# Patient Record
Sex: FEMALE | HISPANIC OR LATINO | Employment: FULL TIME | URBAN - METROPOLITAN AREA
[De-identification: names, ages, dates, MRNs, and addresses within clinical notes are randomized per-mention and may not be internally consistent; named-entity substitution may affect disease eponyms.]

---
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
## 2018-06-11 ENCOUNTER — OFFICE VISIT (OUTPATIENT)
Dept: OBGYN | Facility: CLINIC | Age: 18
End: 2018-06-11
Payer: COMMERCIAL

## 2018-06-11 VITALS
BODY MASS INDEX: 29.74 KG/M2 | OXYGEN SATURATION: 99 % | SYSTOLIC BLOOD PRESSURE: 111 MMHG | DIASTOLIC BLOOD PRESSURE: 67 MMHG | HEART RATE: 74 BPM | WEIGHT: 162.6 LBS

## 2018-06-11 DIAGNOSIS — Z30.46 NEXPLANON REMOVAL: Primary | ICD-10-CM

## 2018-06-11 DIAGNOSIS — Z30.017 NEXPLANON INSERTION: ICD-10-CM

## 2018-06-11 PROCEDURE — 99213 OFFICE O/P EST LOW 20 MIN: CPT | Mod: 25 | Performed by: OBSTETRICS & GYNECOLOGY

## 2018-06-11 PROCEDURE — 11983 REMOVE/INSERT DRUG IMPLANT: CPT | Performed by: OBSTETRICS & GYNECOLOGY

## 2018-06-11 NOTE — MR AVS SNAPSHOT
After Visit Summary   6/11/2018    Gilma Camp    MRN: [id number]           Patient Information     Date Of Birth          2000        Visit Information        Provider Department      6/11/2018 2:00 PM Za Reagan DO; PROC RM 1 [facility]        Care Instructions                                                         If you have any questions regarding your visit, Please contact your care team.    [facility] CLINIC HOURS TELEPHONE NUMBER   Za Reagan DO.    ANTHONY Carson -    BALA Carney       [facility], [facility]  8:30a.m-5:00 p.m   [facility]  [address]  [place], [place] [address] [phone] ask for [facility]    Imaging [phone]       Urgent Care locations:    [facility] Saturday and Sunday   9 am - 5 pm    Monday-Friday   12 pm - 8 pm  Saturday and Sunday   9 am - 5 pm   [phone]     [facility] Labor and Delivery:  [phone]    If you need a medication refill, please contact your pharmacy. Please allow 3 business days for your refill to be completed.  As always, Thank you for trusting us with your healthcare needs!                Follow-ups after your visit        Your next 10 appointments already scheduled     Jun 25, 2018 11:30 AM CDT   Office Visit with Za Reagan DO   [facility] ([facility]    [address]  [facility] [phone]           Bring a current list of meds and any records pertaining to this visit. For Physicals, please bring immunization records and any forms needing to be filled out. Please arrive 10 minutes early to complete paperwork.              Who to contact     If you have questions or need follow up information about today's clinic visit or your schedule please contact [facility]  [place] directly at [phone].  Normal or non-critical lab and imaging results will be communicated to you by MyChart, letter or phone within 4 business days after the clinic has received the results. If you do not hear from us within 7 days, please contact the clinic through [employer] or phone. If you have a critical or abnormal lab result, we will notify you by phone as soon as possible.  Submit refill requests through [employer] or call your pharmacy and they will forward the refill request to us. Please allow 3 business days for your refill to be completed.          Additional Information About Your Visit        [employer] Information     Sifteo lets you send messages to your doctor, view your test results, renew your prescriptions, schedule appointments and more. To sign up, go to [email or web address] . Click on \"Log in\" on the left side of the screen, which will take you to the Welcome page. Then click on \"Sign up Now\" on the right side of the page.     You will be asked to enter the access code listed below, as well as some personal information. Please follow the directions to create your username and password.     Your access code is: [email or web address]  Expires: 2018  2:22 PM     Your access code will  in 90 days. If you need help or a new code, please call your [place] clinic or [phone].        Care EveryWhere ID     This is your Care EveryWhere ID. This could be used by other organizations to access your [place] medical records  [id number]        Your Vitals Were     Pulse Last Period Pulse Oximetry Breastfeeding? BMI (Body Mass Index)       74 2018 (Approximate) 99% No 29.74 kg/m2        Blood Pressure from Last 3 Encounters:   18 111/67   17 132/86   17 124/74    Weight from Last 3 Encounters:   18 162 lb 9.6 oz (73.8 kg)   17 163 lb (73.9 kg) (89 %)*   17 174 lb (78.9 kg) (93 %)*     * Growth percentiles are based on CDC 2-20 Years data.            "   Today, you had the following     No orders found for display         Today's Medication Changes          These changes are accurate as of 6/11/18  2:22 PM.  If you have any questions, ask your nurse or doctor.               Stop taking these medicines if you haven't already. Please contact your care team if you have questions.     drospirenone-ethinyl estradiol 3-0.03 MG per tablet   Commonly known as:  COURTNEY   Stopped by:  Za Reagan,                     Primary Care Provider Office Phone # Fax #    Jacquelyn Alejandre, APRN [facility] [phone]       [address]   [place] [address]        Equal Access to Services     [facility] HospitalSHAYY : Hadii octavio martinez hadjyothio Sojuliocesar, waaxda luqadaha, qaybta kaalmada adeegyada, marvin catherine . So [facility] [place] [phone].    ATENCIÓN: Si habla español, tiene a conley disposición servicios gratuitos de asistencia lingüística. Llame al [phone].    We comply with applicable federal civil rights laws and [place] laws. We do not discriminate on the basis of race, color, national origin, age, disability, sex, sexual orientation, or gender identity.            Thank you!     Thank you for choosing [facility]  for your care. Our goal is always to provide you with excellent care. Hearing back from our patients is one way we can continue to improve our services. Please take a few minutes to complete the written survey that you may receive in the mail after your visit with us. Thank you!             Your Updated Medication List - Protect others around you: Learn how to safely use, store and throw away your medicines at www.disposemymeds.org.          This list is accurate as of 6/11/18  2:22 PM.  Always use your most recent med list.                   Brand Name Dispense Instructions for use Diagnosis    etonogestrel 68 MG Impl    IMPLANON/NEXPLANON    1 each    1 each (68 mg) by Subdermal route once for 1 dose     Nexplanon insertion       polyethylene glycol powder    MIRALAX    510 g    Take 17 g (1 capful) by mouth daily    Constipation, unspecified constipation type

## 2018-06-11 NOTE — PATIENT INSTRUCTIONS
If you have any questions regarding your visit, Please contact your care team.    [facility] CLINIC HOURS TELEPHONE NUMBER   Za Reagan DO.    ANTHONY Carson -    BALA Carney       Monday, Wednesday, Thursday and Friday, [place]  8:30a.m-5:00 p.m   [facility]  [address] [place] [address]  [phone] ask for [facility]    Imaging [phone]       Urgent Care locations:    [facility] Saturday and Sunday   9 am - 5 pm    Monday-Friday   12 pm - 8 pm  Saturday and Sunday   9 am - 5 pm   [phone]     [facility] Labor and Delivery:  [phone]    If you need a medication refill, please contact your pharmacy. Please allow 3 business days for your refill to be completed.  As always, Thank you for trusting us with your healthcare needs!

## 2018-06-11 NOTE — PROGRESS NOTES
This 17 y/o female, , LMP 18, presents for removal of her current Nexplanon which expires this month since it was place on 6/3/15.  She requests insertion of a new Nexplanon since she prefers this form of contraception.  She is s/p primary classical C/S on 14 at 26 weeks gestation and is not interested in future pregnancy at this time.  She denies any risk of pregnancy or STDs.  /67  Pulse 74  Wt 162 lb 9.6 oz (73.8 kg)  LMP 2018 (Approximate)  SpO2 99%  Breastfeeding? No  BMI 29.74 kg/m2  ROS:  10 systems were reviewed and the positives were placed under problems.  Informed consent was reviewed and obtained for removal of her  Nexplanon and insertion of a new Nexplanon since she prefers its use for contraception.  Using sterile technique, the exit garcía was made with a purple pen and the overlying skin was cleansed with betadine x 3 swabs followed by an alcohol prep pad.  The site was then injected with 1% Lidocaine with care to avoid involving the purple garcía with 4 ccs used with spillage.  After testing the site and making sure that she was numb, a stab wound was made at the exit garcía and her current Nexplanon katty was removed without difficulty per protocol using a Mosquito clamp.  Next, the new Nexplanon was inserted without difficulty and Tincture of Benzoin was applied to the peripheral skin of her entry site in her left nondominant arm.  2 steristrips were applied over the wound followed by placement of a Band-aid and ACE wrap.  Instructions on removal of the ACE wrap and Band-aid were reviewed and she felt the implant without difficulty just under the skin.  There were no complications and counts were correct.    Assessment - removal of  Nexplanon contraceptive implant followed by insertion of a new Nexplanon katty  Plan - F/u directions were provided along with her new wallet card explaining that she will be due for removal in 2021.  All her questions were  addressed and she is to return for her next annual exam prn.  This was a 30-minute visit and over 50% of the time was spent in direct pt consultation and 10 minutes were spent in procedure.  NDC #9285-5114-14  Exp 11/2020  Lot #[id number]

## 2018-10-22 ENCOUNTER — OFFICE VISIT (OUTPATIENT)
Dept: OBGYN | Facility: CLINIC | Age: 18
End: 2018-10-22
Payer: COMMERCIAL

## 2018-10-22 VITALS
HEART RATE: 77 BPM | WEIGHT: 168 LBS | SYSTOLIC BLOOD PRESSURE: 109 MMHG | DIASTOLIC BLOOD PRESSURE: 64 MMHG | HEIGHT: 63 IN | BODY MASS INDEX: 29.77 KG/M2

## 2018-10-22 DIAGNOSIS — Z00.00 ANNUAL PHYSICAL EXAM: Primary | ICD-10-CM

## 2018-10-22 PROCEDURE — 99395 PREV VISIT EST AGE 18-39: CPT | Performed by: OBSTETRICS & GYNECOLOGY

## 2018-10-22 PROCEDURE — 87491 CHLMYD TRACH DNA AMP PROBE: CPT | Performed by: OBSTETRICS & GYNECOLOGY

## 2018-10-22 PROCEDURE — 87591 N.GONORRHOEAE DNA AMP PROB: CPT | Performed by: OBSTETRICS & GYNECOLOGY

## 2018-10-22 ASSESSMENT — ANXIETY QUESTIONNAIRES
GAD7 TOTAL SCORE: 0
1. FEELING NERVOUS, ANXIOUS, OR ON EDGE: NOT AT ALL
3. WORRYING TOO MUCH ABOUT DIFFERENT THINGS: NOT AT ALL
5. BEING SO RESTLESS THAT IT IS HARD TO SIT STILL: NOT AT ALL
IF YOU CHECKED OFF ANY PROBLEMS ON THIS QUESTIONNAIRE, HOW DIFFICULT HAVE THESE PROBLEMS MADE IT FOR YOU TO DO YOUR WORK, TAKE CARE OF THINGS AT HOME, OR GET ALONG WITH OTHER PEOPLE: NOT DIFFICULT AT ALL
2. NOT BEING ABLE TO STOP OR CONTROL WORRYING: NOT AT ALL
7. FEELING AFRAID AS IF SOMETHING AWFUL MIGHT HAPPEN: NOT AT ALL
6. BECOMING EASILY ANNOYED OR IRRITABLE: NOT AT ALL

## 2018-10-22 ASSESSMENT — PATIENT HEALTH QUESTIONNAIRE - PHQ9: 5. POOR APPETITE OR OVEREATING: NOT AT ALL

## 2018-10-22 NOTE — PATIENT INSTRUCTIONS
If you have any questions regarding your visit, Please contact your care team.    [facility] [employer] [facility]: [phone]      [facility] CLINIC HOURS TELEPHONE NUMBER   Za Reagan DO.    ANTHONY Carson -    BALA Carney       Monday, Wednesday, Thursday and Friday, [place]  8:30a.m-5:00 p.m   [facility]  [address] [place] [address]  [phone] ask for [facility]    Imaging [phone]       Urgent Care locations:    [facility] Saturday and Sunday   9 am - 5 pm    Monday-Friday   12 pm - 8 pm  Saturday and Sunday   9 am - 5 pm   [phone]     [facility] Labor and Delivery:  [phone]    If you need a medication refill, please contact your pharmacy. Please allow 3 business days for your refill to be completed.  As always, Thank you for trusting us with your healthcare needs!

## 2018-10-22 NOTE — MR AVS SNAPSHOT
After Visit Summary   10/22/2018    Gilma Camp    MRN: [id number]           Patient Information     Date Of Birth          2000        Visit Information        Provider Department      10/22/2018 2:30 PM Za Reagan DO [facility]        Care Instructions                                                         If you have any questions regarding your visit, Please contact your care team.    [employer] [facility]: [phone]      [facility] CLINIC HOURS TELEPHONE NUMBER   Za Reagan DO.    ANTHONY Carson -    BALA Carney       [facility], [facility]  8:30a.m-5:00 p.m   [facility]  [address] [place] [address]  [phone] ask for [facility]    Imaging [phone]       Urgent Care locations:    [facility] Saturday and Sunday   9 am - 5 pm    Monday-Friday   12 pm - 8 pm  Saturday and Sunday   9 am - 5 pm   [phone]     [facility] Labor and Delivery:  [phone]    If you need a medication refill, please contact your pharmacy. Please allow 3 business days for your refill to be completed.  As always, Thank you for trusting us with your healthcare needs!                Follow-ups after your visit        Who to contact     If you have questions or need follow up information about today's clinic visit or your schedule please contact [facility] directly at [phone].  Normal or non-critical lab and imaging results will be communicated to you by MyChart, letter or phone within 4 business days after the clinic has received the results. If you do not hear from us within 7 days, please contact the clinic through MyChart or phone. If you have a critical or abnormal lab result, we will notify you by phone as soon as possible.  Submit refill requests through [employer] or call your pharmacy and  "they will forward the refill request to us. Please allow 3 business days for your refill to be completed.          Additional Information About Your Visit        MyChart Information     TimePoints lets you send messages to your doctor, view your test results, renew your prescriptions, schedule appointments and more. To sign up, go to [email or web address] . Click on \"Log in\" on the left side of the screen, which will take you to the Welcome page. Then click on \"Sign up Now\" on the right side of the page.     You will be asked to enter the access code listed below, as well as some personal information. Please follow the directions to create your username and password.     Your access code is: [email or web address]  Expires: 2019  2:40 PM     Your access code will  in 90 days. If you need help or a new code, please call your [place] clinic or [phone].        Care EveryWhere ID     This is your [facility] EveryWhere ID. This could be used by other organizations to access your [place] medical records  [id number]        Your Vitals Were     Pulse Height Last Period BMI (Body Mass Index)          77 5' 2.91\" (1.598 m) 10/08/2018 (Approximate) 29.84 kg/m2         Blood Pressure from Last 3 Encounters:   10/22/18 109/64   18 111/67   17 132/86    Weight from Last 3 Encounters:   10/22/18 168 lb (76.2 kg)   18 162 lb 9.6 oz (73.8 kg)   17 163 lb (73.9 kg) (89 %)*     * Growth percentiles are based on CDC 2-20 Years data.              Today, you had the following     No orders found for display       Primary Care Provider Office Phone # Fax #    DARINEL Sosa [facility] [phone]       [address]   [place] [address]        Equal Access to Services     DINA QUEEN : Ellen Heath, pascual chen, qamark rose, marvin zimmerman. So [facility] [phone].    ATENCIÓN: Si habla justin, tiene a conley disposición " servicios gratuitos de asistencia lingüística. Amanda coronel [phone].    We comply with applicable federal civil rights laws and [place] laws. We do not discriminate on the basis of race, color, national origin, age, disability, sex, sexual orientation, or gender identity.            Thank you!     Thank you for choosing [facility]  for your care. Our goal is always to provide you with excellent care. Hearing back from our patients is one way we can continue to improve our services. Please take a few minutes to complete the written survey that you may receive in the mail after your visit with us. Thank you!             Your Updated Medication List - Protect others around you: Learn how to safely use, store and throw away your medicines at www.disposemymeds.org.          This list is accurate as of 10/22/18  2:40 PM.  Always use your most recent med list.                   Brand Name Dispense Instructions for use Diagnosis    etonogestrel 68 MG Impl    IMPLANON/NEXPLANON    1 each    1 each (68 mg) by Subdermal route once for 1 dose    Nexplanon removal, Nexplanon insertion       polyethylene glycol powder    MIRALAX    510 g    Take 17 g (1 capful) by mouth daily    Constipation, unspecified constipation type

## 2018-10-22 NOTE — PROGRESS NOTES
"Gilma is a 18 year old female, , who is here for her annual exam.  She is using Nexplanon for contraception and is due for removal/replacement in 2021.  She prefers this method of contraception and denies any issues.  She consents to GC/Chlamydia screening but denies any known exposure.  She declines other STD testing and does not want a flu vaccine.  She is not fasting and prefers to skip labwork this year.    ROS: Ten point review of systems was reviewed and negative except the above.    Health Maintenance   Topic Date Due     INFLUENZA VACCINE (1) 2018     CHLAMYDIA SCREENING  2018     PHQ-2 Q1 YR  2018     TETANUS IMMUNIZATION (SYSTEM ASSIGNED)  2024     PEDS DTAP/TDAP (7 - Td) 2026     HPV IMMUNIZATION  Completed     HIV SCREEN (SYSTEM ASSIGNED)  Completed      Last pap: not applicable due to age < 21  Last Mammogram: not applicable  Last Dexa: not applicable  Last Colonoscopy: not applicable  No results found for: CHOL  No results found for: HDL  No results found for: LDL  No results found for: TRIG  No results found for: CHOLHDLRATIO      OBHX:      PSH:   Past Surgical History:   Procedure Laterality Date      SECTION           PMH: Her past medical, surgical, and obstetric histories were reviewed and are documented in their appropriate chart areas.    ALL/Meds: Her medication and allergy histories were reviewed and are documented in their appropriate chart areas.    SH/FMH: Her social and family history was reviewed and documented in its appropriate chart area.    PE: /64 (BP Location: Right arm, Patient Position: Chair, Cuff Size: Adult Regular)  Pulse 77  Ht 5' 2.91\" (1.598 m)  Wt 168 lb (76.2 kg)  LMP 10/08/2018 (Approximate)  BMI 29.84 kg/m2  Body mass index is 29.84 kg/(m^2).    General Appearance:  healthy, alert, active, no distress  Cardiovascular:  Regular rate and Rhythm without murmur  Neck: Supple, no adenopathy and thyroid " normal  Lungs:  Clear, without wheeze, rale or rhonchi  Breast: normal breast exam  Abdomen: Benign, Soft, flat, non-tender, No masses, organomegaly, No inguinal nodes and Bowel sounds normoactive.   Left Arm:  I am able to feel the Nexplanon implant beneath the skin and she is able to feel it also.  Pelvic:       - Ext: Vulva and perineum are normal without lesion, mass or discharge        - Urethra: normal without discharge        - Urethral Meatus: normal appearance       - Bladder: no tenderness, no masses       - Vagina: Normal mucosa, no discharge        - Cervix: normal and nulliparous       - Uterus:Normal shape, position and consistency        - Adnexa: Normal without masses or tenderness       - Rectal: deferred    A/P:  Well Woman Exam, Using Nexplanon for Contraception     -  I discussed the new pap recommendations regarding screening.  Explained the rationale for increased intervals between paps.  Questions asked and answered.  She does agree to this regiment.  Pap was not obtained since she is younger than age 21   -  BC: Nexplanon - due for removal/replacement in June 2021   -  Submit GC/chlamydia cultures due to age < 26   -  Patient declines other STD testing    -  Patient declines a flu vaccine.   -  No orders of the defined types were placed in this encounter.     -  Encouraged self-breast exam   -  Encouraged low fat diet, regular exercise, and adequate calcium intake.    Za Reagan,   FACOG, FACS

## 2018-10-23 ASSESSMENT — PATIENT HEALTH QUESTIONNAIRE - PHQ9: SUM OF ALL RESPONSES TO PHQ QUESTIONS 1-9: 2

## 2018-10-23 ASSESSMENT — ANXIETY QUESTIONNAIRES: GAD7 TOTAL SCORE: 0

## 2018-10-24 LAB
C TRACH DNA SPEC QL NAA+PROBE: NEGATIVE
N GONORRHOEA DNA SPEC QL NAA+PROBE: NEGATIVE
SPECIMEN SOURCE: NORMAL
SPECIMEN SOURCE: NORMAL

## 2019-07-02 ENCOUNTER — OFFICE VISIT (OUTPATIENT)
Dept: PEDIATRICS | Facility: CLINIC | Age: 19
End: 2019-07-02
Payer: COMMERCIAL

## 2019-07-02 ENCOUNTER — ANCILLARY PROCEDURE (OUTPATIENT)
Dept: ULTRASOUND IMAGING | Facility: CLINIC | Age: 19
End: 2019-07-02
Attending: NURSE PRACTITIONER
Payer: COMMERCIAL

## 2019-07-02 VITALS
HEIGHT: 63 IN | DIASTOLIC BLOOD PRESSURE: 70 MMHG | WEIGHT: 158.6 LBS | TEMPERATURE: 97.6 F | OXYGEN SATURATION: 98 % | BODY MASS INDEX: 28.1 KG/M2 | HEART RATE: 72 BPM | SYSTOLIC BLOOD PRESSURE: 105 MMHG

## 2019-07-02 DIAGNOSIS — N93.8 DUB (DYSFUNCTIONAL UTERINE BLEEDING): ICD-10-CM

## 2019-07-02 DIAGNOSIS — N93.8 DUB (DYSFUNCTIONAL UTERINE BLEEDING): Primary | ICD-10-CM

## 2019-07-02 LAB
ERYTHROCYTE [DISTWIDTH] IN BLOOD BY AUTOMATED COUNT: 12.5 % (ref 10–15)
FERRITIN SERPL-MCNC: 8 NG/ML (ref 12–150)
HCT VFR BLD AUTO: 39.2 % (ref 35–47)
HGB BLD-MCNC: 13.4 G/DL (ref 11.7–15.7)
MCH RBC QN AUTO: 29.9 PG (ref 26.5–33)
MCHC RBC AUTO-ENTMCNC: 34.2 G/DL (ref 31.5–36.5)
MCV RBC AUTO: 88 FL (ref 78–100)
PLATELET # BLD AUTO: 333 10E9/L (ref 150–450)
RBC # BLD AUTO: 4.48 10E12/L (ref 3.8–5.2)
TSH SERPL DL<=0.005 MIU/L-ACNC: 1.13 MU/L (ref 0.4–4)
WBC # BLD AUTO: 6.3 10E9/L (ref 4–11)

## 2019-07-02 PROCEDURE — 76856 US EXAM PELVIC COMPLETE: CPT | Mod: 59 | Performed by: RADIOLOGY

## 2019-07-02 PROCEDURE — 82728 ASSAY OF FERRITIN: CPT | Performed by: NURSE PRACTITIONER

## 2019-07-02 PROCEDURE — 85027 COMPLETE CBC AUTOMATED: CPT | Performed by: NURSE PRACTITIONER

## 2019-07-02 PROCEDURE — 76830 TRANSVAGINAL US NON-OB: CPT | Performed by: RADIOLOGY

## 2019-07-02 PROCEDURE — 36415 COLL VENOUS BLD VENIPUNCTURE: CPT | Performed by: NURSE PRACTITIONER

## 2019-07-02 PROCEDURE — 99214 OFFICE O/P EST MOD 30 MIN: CPT | Performed by: NURSE PRACTITIONER

## 2019-07-02 PROCEDURE — 84443 ASSAY THYROID STIM HORMONE: CPT | Performed by: NURSE PRACTITIONER

## 2019-07-02 ASSESSMENT — MIFFLIN-ST. JEOR: SCORE: 1453.53

## 2019-07-02 NOTE — PROGRESS NOTES
Subjective     Gilma Camp is a 19 year old female who presents to clinic today for the following health issues:    HPI   Vaginal Bleeding (Dysmenorrhea)  Onset: 1 year    Description:   Duration of bleeding episodes: 1 month  Frequency between periods:  Every other month  Describe bleeding/flow:   Clots: no  Number of pads/hour: 1  Cramping: before    Accompanying Signs & Symptoms:  Weakness: no  Lightheadedness: YES- more in the mornings  Hot flashes: no  Nosebleeds/Easy bruising: no  Vaginal Discharge: no    History:  No LMP recorded. Patient has had an implant.  Previous normal periods: YES  Contraceptive use: implant   Possibility of Pregnancy: no  Any bleeding after intercourse: no  Age of first period (menarche): 10  Abnormal PAP Smears: just turned 19    Precipitating factors:   none    Alleviating factors:  none    Therapies Tried and outcome: none  Has been having bleeding for a whole month straight every other month for about a year   Had Nexplanon before and had no menses at first   Saw GYN in October for her wellness exam   States feels light headed in the morning       Patient Active Problem List   Diagnosis     Bilateral low back pain without sciatica, unspecified chronicity     Past Surgical History:   Procedure Laterality Date      SECTION         Social History     Tobacco Use     Smoking status: Never Smoker     Smokeless tobacco: Never Used   Substance Use Topics     Alcohol use: No     Family History   Problem Relation Age of Onset     Unknown/Adopted Paternal Grandmother      Unknown/Adopted Paternal Grandfather          Current Outpatient Medications   Medication Sig Dispense Refill     polyethylene glycol (MIRALAX) powder Take 17 g (1 capful) by mouth daily 510 g 11     etonogestrel (IMPLANON/NEXPLANON) 68 MG IMPL 1 each (68 mg) by Subdermal route once for 1 dose 1 each 0     etonogestrel (IMPLANON/NEXPLANON) 68 MG IMPL 1 each (68 mg) by Subdermal route once for 1 dose 1 each 0  "    No Known Allergies  BP Readings from Last 3 Encounters:   07/02/19 105/70   10/22/18 109/64   06/11/18 111/67    Wt Readings from Last 3 Encounters:   07/02/19 71.9 kg (158 lb 9.6 oz)   10/22/18 76.2 kg (168 lb)   06/11/18 73.8 kg (162 lb 9.6 oz)           Reviewed and updated as needed this visit by Provider         Review of Systems   ROS COMP: Constitutional, HEENT, cardiovascular, pulmonary, gi and gu systems are negative, except as otherwise noted.      Objective    /70 (BP Location: Right arm, Patient Position: Sitting, Cuff Size: Adult Regular)   Pulse 72   Temp 97.6  F (36.4  C) (Temporal)   Ht 1.6 m (5' 3\")   Wt 71.9 kg (158 lb 9.6 oz)   SpO2 98%   Breastfeeding? No   BMI 28.09 kg/m    Body mass index is 28.09 kg/m .  Physical Exam   GENERAL APPEARANCE: alert, active and no distress  NECK: no adenopathy  RESP: lungs clear to auscultation - no rales, rhonchi or wheezes  CV: regular rates and rhythm  MS: extremities normal- no gross deformities noted  SKIN: no suspicious lesions or rashes  NEURO: Normal strength and tone, mentation intact and speech normal  PSYCH: mentation appears normal and affect normal/bright    Diagnostic Test Results:  Results for orders placed or performed in visit on 07/02/19   CBC with platelets   Result Value Ref Range    WBC 6.3 4.0 - 11.0 10e9/L    RBC Count 4.48 3.8 - 5.2 10e12/L    Hemoglobin 13.4 11.7 - 15.7 g/dL    Hematocrit 39.2 35.0 - 47.0 %    MCV 88 78 - 100 fl    MCH 29.9 26.5 - 33.0 pg    MCHC 34.2 31.5 - 36.5 g/dL    RDW 12.5 10.0 - 15.0 %    Platelet Count 333 150 - 450 10e9/L   Ferritin   Result Value Ref Range    Ferritin 8 (L) 12 - 150 ng/mL   TSH with free T4 reflex   Result Value Ref Range    TSH 1.13 0.40 - 4.00 mU/L     Recent Results (from the past 744 hour(s))   US Pelvic Complete w Transvaginal    Narrative    EXAMINATION: US PELVIC COMPLETE WITH TRANSVAGINAL, 7/2/2019 3:17 PM     COMPARISON: 8/22/2014    HISTORY: Dysfunctional uterine " "bleeding. Menorrhagia for entire months  for every other month, for one year. The patient has Nexplanon.    TECHNIQUE: The pelvis was scanned in standard fashion with  transabdominal and transvaginal transducer(s) using both grey scale  and color Doppler techniques.    FINDINGS  The uterus measures 8.6 x 3.3 x 6 cm.  There is no evidence of a focal  fibroid. There are prominent periuterine vessels, they are  nonspecific.  The endometrium is homogeneous and measures 2 mm.  The right ovary measures 3.7 x 3 x 2.6 cm and the left ovary measures  3.8 x 2 x 3.5 cm.  There is blood flow to both ovaries.  There is no free fluid in the pelvis. No adnexal mass.      Impression    IMPRESSION:   1.  No ultrasound findings to explain the patient's menorrhagia.    AN MD EVAN             Assessment & Plan     Gilma was seen today for abnormal bleeding problem.    Diagnoses and all orders for this visit:    DUB (dysfunctional uterine bleeding)  -     CBC with platelets  -     Ferritin  -     TSH with free T4 reflex  -     US Pelvic Complete w Transvaginal; Future  -     OB/GYN REFERRAL    See Patient Instructions  Patient Instructions     PLAN:   1.   Orders Placed This Encounter   Procedures     US Pelvic Complete w Transvaginal     CBC with platelets     Ferritin     TSH with free T4 reflex     OB/GYN REFERRAL       2. Patient needs to follow up in if no improvement,or sooner if worsening of symptoms or other symptoms develop.  FURTHER TESTING:       - pelvic  ultrasound  CONSULTATION/REFERRAL to Gynecology  Will follow up and/or notify patient on results via phone or mail to determine further need for followup      BMI:   Estimated body mass index is 28.09 kg/m  as calculated from the following:    Height as of this encounter: 1.6 m (5' 3\").    Weight as of this encounter: 71.9 kg (158 lb 9.6 oz).   Weight management plan: Discussed healthy diet and exercise guidelines    No follow-ups on file.    Jacquelyn Alejandre, DARINEL " CNP  [facility]

## 2019-07-02 NOTE — PATIENT INSTRUCTIONS
PLAN:   1.   Orders Placed This Encounter   Procedures     US Pelvic Complete w Transvaginal     CBC with platelets     Ferritin     TSH with free T4 reflex     OB/GYN REFERRAL       2. Patient needs to follow up in if no improvement,or sooner if worsening of symptoms or other symptoms develop.  FURTHER TESTING:       - pelvic  ultrasound  CONSULTATION/REFERRAL to Gynecology  Will follow up and/or notify patient on results via phone or mail to determine further need for followup

## 2019-07-02 NOTE — LETTER
July 3, 2019      Gilma Camp  [address]   [place] [facility] [address]              Gilma Camp,     Attached are your test results.     Pelvic ultrasound is normal. Keep appointment with GYN as we discussed.    Please contact us if you have any questions.     Jacquelyn Alejandre CNP     Results for orders placed or performed in visit on 07/02/19   US Pelvic Complete w Transvaginal    Narrative    EXAMINATION: US PELVIC COMPLETE WITH TRANSVAGINAL, 7/2/2019 3:17 PM     COMPARISON: 8/22/2014    HISTORY: Dysfunctional uterine bleeding. Menorrhagia for entire months  for every other month, for one year. The patient has Nexplanon.    TECHNIQUE: The pelvis was scanned in standard fashion with  transabdominal and transvaginal transducer(s) using both grey scale  and color Doppler techniques.    FINDINGS  The uterus measures 8.6 x 3.3 x 6 cm.  There is no evidence of a focal  fibroid. There are prominent periuterine vessels, they are  nonspecific.  The endometrium is homogeneous and measures 2 mm.  The right ovary measures 3.7 x 3 x 2.6 cm and the left ovary measures  3.8 x 2 x 3.5 cm.  There is blood flow to both ovaries.  There is no free fluid in the pelvis. No adnexal mass.      Impression    IMPRESSION:   1.  No ultrasound findings to explain the patient's menorrhagia.    WILLIAN GORDON MD

## 2019-07-02 NOTE — NURSING NOTE
"Chief Complaint   Patient presents with     Abnormal Bleeding Problem     menstrual cycles are lasting longer       Initial /70 (BP Location: Right arm, Patient Position: Sitting, Cuff Size: Adult Regular)   Pulse 72   Temp 97.6  F (36.4  C) (Temporal)   Ht 1.6 m (5' 3\")   Wt 71.9 kg (158 lb 9.6 oz)   SpO2 98%   Breastfeeding? No   BMI 28.09 kg/m   Estimated body mass index is 28.09 kg/m  as calculated from the following:    Height as of this encounter: 1.6 m (5' 3\").    Weight as of this encounter: 71.9 kg (158 lb 9.6 oz).  Medication Reconciliation: complete      LORRAINE Alvarez      "

## 2019-07-02 NOTE — RESULT ENCOUNTER NOTE
Please call Gilma Camp,    Attached are your test results.  Pelvic ultrasound is normal   Keep appointment with GYN as we discussed    Please contact us if you have any questions.    Jacquelyn Alejandre, CNP

## 2019-07-03 ENCOUNTER — TELEPHONE (OUTPATIENT)
Dept: PEDIATRICS | Facility: CLINIC | Age: 19
End: 2019-07-03

## 2019-07-03 NOTE — TELEPHONE ENCOUNTER
Left VM to call back to the clinic.  Johanne Pope, RN     Please call   -Normal red blood cell (hgb) levels, normal white blood cell count and normal platelet levels.   -TSH (thyroid stimulating hormone) level is normal which indicates normal thyroid function.   -Low iron store levels (ferritin).  ADVISE: increasing iron in your diet and consider taking iron supplement for  (ferrous gluconate 325 mg daily - to avoid constipation from the supplement you should increase fluid intake and fiber in your diet)  Also, recheck your labs in 2 months.   Jacquelyn Alejandre, LAKSHMI, APRN CNP

## 2019-07-05 NOTE — TELEPHONE ENCOUNTER
Attempt # 2  Called patient, left message to call back.    Radha Garcia RN   [facility]

## 2019-07-05 NOTE — TELEPHONE ENCOUNTER
Patient called back, discussed the results as noted below, understanding verbalized. Patient will follow up as indicated.     Radha Garcia RN, BSN, PHN  [facility]

## 2019-08-13 ENCOUNTER — OFFICE VISIT (OUTPATIENT)
Dept: OBGYN | Facility: CLINIC | Age: 19
End: 2019-08-13
Attending: NURSE PRACTITIONER
Payer: COMMERCIAL

## 2019-08-13 VITALS
DIASTOLIC BLOOD PRESSURE: 67 MMHG | HEART RATE: 68 BPM | BODY MASS INDEX: 28.58 KG/M2 | SYSTOLIC BLOOD PRESSURE: 112 MMHG | WEIGHT: 161.31 LBS

## 2019-08-13 DIAGNOSIS — Z97.5 NEXPLANON IN PLACE: ICD-10-CM

## 2019-08-13 DIAGNOSIS — N92.1 METRORRHAGIA: Primary | ICD-10-CM

## 2019-08-13 PROCEDURE — 99213 OFFICE O/P EST LOW 20 MIN: CPT | Performed by: OBSTETRICS & GYNECOLOGY

## 2019-08-13 RX ORDER — ESTRADIOL 0.5 MG/1
0.5 TABLET ORAL DAILY
Qty: 30 TABLET | Refills: 0 | Status: SHIPPED | OUTPATIENT
Start: 2019-08-13 | End: 2021-03-11

## 2019-08-13 NOTE — PATIENT INSTRUCTIONS
Please call if you any questions.    [facility]  [address] [place]   [address]  [phone]        Nessa Gonzalez

## 2019-08-13 NOTE — PROGRESS NOTES
Subjective  19 year old non-pregnant female presents today complaining of metrorrhagia.  Patient has the Nexplanon in place.  This is the patient's second Nexplanon.  This was placed last year.  She has very irregular vaginal bleeding now.  She had vaginal bleeding 2 months ago that lasted for the whole month and now she got her menses again after only 2 weeks.  She did have irregular vaginal bleeding with the first Nexplanon but never where she would bleed for a month straight.  No dysmenorrhea.  Patient is sexually active.  Patient has been given oral contractive pills in the past along with the Nexplanon to help with the metrorrhagia and it did not help.  She also wasn't good about taking the pill daily.  A recent TSH and Hgb were normal.  No tobacco abuse.  We reviewed her recent ultrasound.  We discussed treatment options and other birth control options.  Will try Estrace for one month.  She is not wanting the Nexplanon removed at this time.         ROS: 10 point ROS neg other than the symptoms noted above in the HPI.  Past Medical History:   Diagnosis Date     NO ACTIVE PROBLEMS      Past Surgical History:   Procedure Laterality Date      SECTION       Family History   Problem Relation Age of Onset     Unknown/Adopted Paternal Grandmother      Unknown/Adopted Paternal Grandfather      Social History     Tobacco Use     Smoking status: Never Smoker     Smokeless tobacco: Never Used   Substance Use Topics     Alcohol use: No         Objective  Vitals: /67   Pulse 68   Wt 73.2 kg (161 lb 5 oz)   BMI 28.58 kg/m    BMI= Body mass index is 28.58 kg/m .    General appearance=well developed, well-nourished female  Psych=mood is stable, alert and oriented x3      Pelvic ultrasound=19:  FINDINGS  The uterus measures 8.6 x 3.3 x 6 cm.  There is no evidence of a focal  fibroid. There are prominent periuterine vessels, they are  nonspecific.  The endometrium is homogeneous and measures 2 mm.  The  right ovary measures 3.7 x 3 x 2.6 cm and the left ovary measures  3.8 x 2 x 3.5 cm.  There is blood flow to both ovaries.  There is no free fluid in the pelvis. No adnexal mass.                                                                      IMPRESSION:   1.  No ultrasound findings to explain the patient's menorrhagia      Assessment  1.)  Metrorrhagia  2.)  Nexplanon in place      Plan  1.)  Estrace ordered  2.)  Follow-up in one month  3.)  Consider Nexplanon removal      25 minutes was spent face to face with the patient today discussing her history, diagnosis, and follow-up plan as noted above.  Over 50% of the visit was spent in counseling and coordination of care.        Nursing notes read and reviewed    Nessa Gonzalez

## 2019-08-19 ENCOUNTER — TELEPHONE (OUTPATIENT)
Dept: OBGYN | Facility: CLINIC | Age: 19
End: 2019-08-19

## 2019-08-19 NOTE — TELEPHONE ENCOUNTER
Called patient and go her scheduled with Dr. Gonzalez on 9/10     [facility]  Women's Health

## 2019-08-19 NOTE — TELEPHONE ENCOUNTER
[facility] Call Center    Phone Message    May a detailed message be left on voicemail: yes    Reason for Call: Other: Pt would like an appt to have the Nexplanon Removal . Please advise     Action Taken: Message routed to:  [facility] p [phone]

## 2019-09-10 ENCOUNTER — OFFICE VISIT (OUTPATIENT)
Dept: OBGYN | Facility: CLINIC | Age: 19
End: 2019-09-10
Payer: COMMERCIAL

## 2019-09-10 VITALS
DIASTOLIC BLOOD PRESSURE: 63 MMHG | WEIGHT: 164.7 LBS | BODY MASS INDEX: 29.18 KG/M2 | HEART RATE: 67 BPM | OXYGEN SATURATION: 100 % | SYSTOLIC BLOOD PRESSURE: 112 MMHG

## 2019-09-10 DIAGNOSIS — Z30.430 ENCOUNTER FOR IUD INSERTION: ICD-10-CM

## 2019-09-10 DIAGNOSIS — N92.1 METRORRHAGIA: ICD-10-CM

## 2019-09-10 DIAGNOSIS — Z30.46 NEXPLANON REMOVAL: Primary | ICD-10-CM

## 2019-09-10 PROBLEM — Z97.5 NEXPLANON IN PLACE: Status: RESOLVED | Noted: 2019-08-13 | Resolved: 2019-09-10

## 2019-09-10 PROBLEM — Z97.5 IUD (INTRAUTERINE DEVICE) IN PLACE: Status: ACTIVE | Noted: 2019-09-10

## 2019-09-10 PROCEDURE — 58300 INSERT INTRAUTERINE DEVICE: CPT | Performed by: OBSTETRICS & GYNECOLOGY

## 2019-09-10 PROCEDURE — 11982 REMOVE DRUG IMPLANT DEVICE: CPT | Performed by: OBSTETRICS & GYNECOLOGY

## 2019-09-10 PROCEDURE — 99213 OFFICE O/P EST LOW 20 MIN: CPT | Mod: 25 | Performed by: OBSTETRICS & GYNECOLOGY

## 2019-09-10 NOTE — PROGRESS NOTES
CC/HPI: Gilma Camp is a 19 year old who presents to the clinic for an IUD insertion.   Patient's last menstrual period was 08/15/2019 (approximate)..  Today's pregnancy test was not done.  Nexplanon in place.  Patient has been provided with written information.  I have reviewed the risks of the IUD including pregnancy, PID, life threatening infection, perforation, expulsion, cramping, changes in bleeding and ovarian cysts. Benefits of the IUD and alternative family planning methods have been discussed.  Patients questions have been answered.  Patient has verbalized understanding of risks and benefits and has signed the consent form.    No Known Allergies  Current Outpatient Medications   Medication Sig Dispense Refill     estradiol (ESTRACE) 0.5 MG tablet Take 1 tablet (0.5 mg) by mouth daily (Patient not taking: Reported on 9/10/2019) 30 tablet 0     etonogestrel (IMPLANON/NEXPLANON) 68 MG IMPL 1 each (68 mg) by Subdermal route once for 1 dose 1 each 0     etonogestrel (IMPLANON/NEXPLANON) 68 MG IMPL 1 each (68 mg) by Subdermal route once for 1 dose 1 each 0     polyethylene glycol (MIRALAX) powder Take 17 g (1 capful) by mouth daily (Patient not taking: Reported on 9/10/2019) 510 g 11      Past Medical History:   Diagnosis Date     NO ACTIVE PROBLEMS      Family History   Problem Relation Age of Onset     Unknown/Adopted Paternal Grandmother      Unknown/Adopted Paternal Grandfather      Social History     Socioeconomic History     Marital status: Single     Spouse name: Not on file     Number of children: 1     Years of education: Not on file     Highest education level: Not on file   Occupational History     Occupation: student     Employer: UNEMPLOYED   Social Needs     Financial resource strain: Not on file     Food insecurity:     Worry: Not on file     Inability: Not on file     Transportation needs:     Medical: Not on file     Non-medical: Not on file   Tobacco Use     Smoking status: Never Smoker      Smokeless tobacco: Never Used   Substance and Sexual Activity     Alcohol use: No     Drug use: No     Sexual activity: Yes     Partners: Male     Birth control/protection: Implant   Lifestyle     Physical activity:     Days per week: Not on file     Minutes per session: Not on file     Stress: Not on file   Relationships     Social connections:     Talks on phone: Not on file     Gets together: Not on file     Attends Jew service: Not on file     Active member of club or organization: Not on file     Attends meetings of clubs or organizations: Not on file     Relationship status: Not on file     Intimate partner violence:     Fear of current or ex partner: Not on file     Emotionally abused: Not on file     Physically abused: Not on file     Forced sexual activity: Not on file   Other Topics Concern     Parent/sibling w/ CABG, MI or angioplasty before 65F 55M? Not Asked   Social History Narrative     Not on file     Past Surgical History:   Procedure Laterality Date      SECTION           EXAM:  /63   Pulse 67   Wt 74.7 kg (164 lb 11.2 oz)   LMP 08/15/2019 (Approximate)   SpO2 100%   BMI 29.18 kg/m    PELVIC EXAM:  Vulva: No external lesions, normal hair distribution, no adenopathy  Vagina: Moist, pink, no abnormal discharge, well rugated, no lesions  Cervix: smooth, pink, no visible lesions, neg CMT  Uterus: Normal size, anteverted, non-tender, mobile  Ovaries: No mass, non-tender, mobile  Rectal exam: deferred    IUD type: Mirena  Lot # [id number]  NDC# [id number] Exp         Procedure:  Uterus assessed for position and is Anteverted.  Speculum inserted.  Betadine prep of cervix done.  Tenaculum applied at 10/2 o'clock position and gentle traction was appiled to elongate the cervical canal.  Uterus sounded to 8cm's.  No cervical dilators were used.   IUD inserted in the usual fashion without significant resistance, severe protracted pain or excessive bleeding. The tenaculum was  removed with scant bleeding from the puncture sites that was managed with some direct pressure using Quezada swabs. Silver Nitrate was required to establish hemostasis.  Strings trimmed to 3 cm's.  Patient  tolerated the procedure well without any prolonged pain or syncopy.      Instructions given to patient regarding checking IUD strings, returning to the clinic if pain or inability to check strings and/or irregular bleeding.  Return to the clinic in 2 weeks for IUD follow up   See AVS for complete instructions    Nessa Gonzalez,

## 2019-09-10 NOTE — PATIENT INSTRUCTIONS
What Mirena Users May Expect    What to watch for right after Mirena is placed  Some women may experience uterine cramps, bleeding, and/or dizziness during and right after Mirena is placed. To help minimize the cramps, you may taken ibuprofen 600 mg with food prior to and every 6 hours after your appointment if needed. These symptoms should improve over the next 24 hours.  Mild cramping may be present for a few days after your placement  As a follow up, you should visit your clinic once in the first 4 to 12 weeks after Mirena is placed to make sure it is in the right position. After that, Mirena can be checked once a year as part of your routine exam.    Please use a back-up method (abstinence or condoms) for 5 days after placement.    Your periods may change  For the first 3 to 6 months, your monthly period may become irregular. You may also have frequent spotting or light bleeding. A few women have heavy bleeding during this time. After your body adjusts, the number of bleeding days is likely to decrease (but may remain irregular), and you may even find that your periods stop altogether for as long as Mirena is in place. Around the end of the third month of use, you may see up to a 75% reduction in the amount of menstrual bleeding. By one year, about 1 out of 5 users may hay have no period at all. At the end of two years, 70% have little or no bleeding. Your periods will return rapidly once Mirena is removed.     Mirena Strings  You may check your own Mirena strings by inserting a finger into the vagina and feeling the strings as they exit the cervix.  The strings will initially feel firm, like fishing line, but will soften over a few weeks.  After the strings have softened, you or your partner should not be able to feel the strings during intercourse.  If you can feel the IUD, see your healthcare provider to have the position confirmed.  You may use tampons with Mirena in place.    Mirena does not protect against  HIV or STDs.  Mirena does not prevent the formation of ovarian cysts.  Mirena does not typically reduce acne or cause weight gain or mood changes.    Please call [facility] [place] [phone] or [place] [phone] if you have questions or concerns.    For more information:  [email or web address]

## 2019-09-10 NOTE — PROGRESS NOTES
Subjective  19 year old non-pregnant female presents today to have her Nexplanon removed.  I saw patient in August complaining of metrorrhagia.  This is the patient's second Nexplanon.  This was placed last year.  She has very irregular vaginal bleeding now.  She had vaginal bleeding 2 months ago that lasted for the whole month and now she got her menses again after only 2 weeks.  She did have irregular vaginal bleeding with the first Nexplanon but never where she would bleed for a month straight.  No dysmenorrhea.  Patient is sexually active.  Patient has been given oral contractive pills in the past along with the Nexplanon to help with the metrorrhagia and it did not help.  She also wasn't good about taking the pill daily.  I ordered Estrace for her and she still had irregular vaginal bleeding with it.  She wants the Nexplanon removed and the Mirena IUD placed.       ROS: 10 point ROS neg other than the symptoms noted above in the HPI.  Past Medical History:   Diagnosis Date     NO ACTIVE PROBLEMS      Past Surgical History:   Procedure Laterality Date      SECTION       Family History   Problem Relation Age of Onset     Unknown/Adopted Paternal Grandmother      Unknown/Adopted Paternal Grandfather      Social History     Tobacco Use     Smoking status: Never Smoker     Smokeless tobacco: Never Used   Substance Use Topics     Alcohol use: No         Objective  Vitals: /63   Pulse 67   Wt 74.7 kg (164 lb 11.2 oz)   LMP 08/15/2019 (Approximate)   SpO2 100%   BMI 29.18 kg/m    BMI= Body mass index is 29.18 kg/m .    General appearance=well developed, well-nourished female  Psych=mood is stable, alert and oriented x3  Skin=no rashes or lesions seen  Neck=overall appearance is normal  Thyroid=no enlargement  Lungs=non-labored breathing, no use of accessory muscles   Abd=soft, Nontender/nondistended, no masses, no signs of hernias, no evidence of hepatosplenomegaly  PELVIC:    External genitalia:  normal without lesions or masses  Urethral meatus: no lesions or prolapse noted, normal size  Urethra: no masses, non tender  Bladder: non tender, no fullness  Vagina: normal mucosa and rugae, no discharge.  Cervix: normal without lesion, no cervical motion tenderness, healthy, nulliparous  Uterus: small, mobile, nontender.  Adnexa: non tender, without masses  Rectal: deffered  Ext=no clubbing or cyanosis, no swelling      Procedure Note:  The area is palpated and the implant is present and mobile.  The removal is discribed and she is given the opportunity to ask questions.  She does agree to proceed and the consent is signed,  The area is then cleaned with betadyne and infiltrated with 1% lidocaine with epinephrine.  A small incision is made, overlying the insertional incision.  The implant is brought to the incision and the capsul is opened.  The implant is removed and is intact.  Good hemostasis is noted.  The incision is closed with steri-strips and a bandaid.  The arm is wrapped.  The patient tolerated the procedure well.        Assessment  1.)  Metrorrhagia      Plan  1.)  Remove Nexplanon  2.)  Place Mirena IUD      25 minutes was spent face to face with the patient today discussing her history, diagnosis, and follow-up plan as noted above.  Over 50% of the visit was spent in counseling and coordination of care not including time spent on procedures.        Nursing notes read and reviewed    Nessa Gonzalez DO

## 2019-10-30 ENCOUNTER — OFFICE VISIT (OUTPATIENT)
Dept: OBGYN | Facility: CLINIC | Age: 19
End: 2019-10-30
Payer: COMMERCIAL

## 2019-10-30 VITALS
HEART RATE: 73 BPM | WEIGHT: 165 LBS | DIASTOLIC BLOOD PRESSURE: 63 MMHG | OXYGEN SATURATION: 98 % | SYSTOLIC BLOOD PRESSURE: 102 MMHG | HEIGHT: 63 IN | BODY MASS INDEX: 29.23 KG/M2

## 2019-10-30 DIAGNOSIS — Z12.4 SCREENING FOR MALIGNANT NEOPLASM OF CERVIX: Primary | ICD-10-CM

## 2019-10-30 DIAGNOSIS — Z11.3 SCREEN FOR STD (SEXUALLY TRANSMITTED DISEASE): ICD-10-CM

## 2019-10-30 DIAGNOSIS — N89.8 VAGINAL DISCHARGE: ICD-10-CM

## 2019-10-30 LAB
SPECIMEN SOURCE: NORMAL
WET PREP SPEC: NORMAL

## 2019-10-30 PROCEDURE — 87340 HEPATITIS B SURFACE AG IA: CPT | Performed by: OBSTETRICS & GYNECOLOGY

## 2019-10-30 PROCEDURE — 36415 COLL VENOUS BLD VENIPUNCTURE: CPT | Performed by: OBSTETRICS & GYNECOLOGY

## 2019-10-30 PROCEDURE — 87491 CHLMYD TRACH DNA AMP PROBE: CPT | Performed by: OBSTETRICS & GYNECOLOGY

## 2019-10-30 PROCEDURE — 99395 PREV VISIT EST AGE 18-39: CPT | Performed by: OBSTETRICS & GYNECOLOGY

## 2019-10-30 PROCEDURE — G0145 SCR C/V CYTO,THINLAYER,RESCR: HCPCS | Performed by: OBSTETRICS & GYNECOLOGY

## 2019-10-30 PROCEDURE — 87389 HIV-1 AG W/HIV-1&-2 AB AG IA: CPT | Performed by: OBSTETRICS & GYNECOLOGY

## 2019-10-30 PROCEDURE — 87591 N.GONORRHOEAE DNA AMP PROB: CPT | Performed by: OBSTETRICS & GYNECOLOGY

## 2019-10-30 PROCEDURE — 87210 SMEAR WET MOUNT SALINE/INK: CPT | Performed by: OBSTETRICS & GYNECOLOGY

## 2019-10-30 PROCEDURE — 86780 TREPONEMA PALLIDUM: CPT | Performed by: OBSTETRICS & GYNECOLOGY

## 2019-10-30 ASSESSMENT — MIFFLIN-ST. JEOR: SCORE: 1481.18

## 2019-10-30 NOTE — LETTER
November 1, 2019      Gilma Camp  [address]   [place] [facility] [address]    Dear ,      I am happy to inform you that your recent cervical cancer screening test (PAP smear) was normal.      Preventative screenings such as this help to ensure your health for years to come. You should repeat a pap smear in 3 years, unless otherwise directed.      You will still need to return to the clinic every year for your annual exam and other preventive tests.     If you have additional questions regarding this result, please call our registered nurse, Lesli at [phone].      Sincerely,      Za Reagan DO/gomez

## 2019-10-30 NOTE — PROGRESS NOTES
"Gilma is a 19 year old female, , who is here for her annual exam.  She would like a STD screen even though she is not aware of any exposure risk.  She is using a Mirena IUD for contraception and prefers this form of birth control.  It will be due for removal/replacement in 2024.  She had a C/S in  and prefers to wait a couple more years prior to having another pregnancy.    ROS: Ten point review of systems was reviewed and negative except the above.    Health Maintenance   Topic Date Due     PAP  2019     CHLAMYDIA SCREENING  10/22/2019     PREVENTIVE CARE VISIT  10/22/2019     DTAP/TDAP/TD IMMUNIZATION (7 - Td) 2024     HIV SCREENING  Completed     PHQ-2  Completed     HPV IMMUNIZATION  Completed     INFLUENZA VACCINE  Addressed     IPV IMMUNIZATION  Aged Out     MENINGITIS IMMUNIZATION  Aged Out      Last pap: first one today at age 19  Last Mammogram: not due  Last Dexa: not due  Last Colonoscopy: not due  No results found for: CHOL  No results found for: HDL  No results found for: LDL  No results found for: TRIG  No results found for: CHOLHDLRATIO      OBHX:      PSH:   Past Surgical History:   Procedure Laterality Date      SECTION           PMH: Her past medical, surgical, and obstetric histories were reviewed and are documented in their appropriate chart areas.    ALL/Meds: Her medication and allergy histories were reviewed and are documented in their appropriate chart areas.    SH/FMH: Her social and family history was reviewed and documented in its appropriate chart area.    PE: /63   Pulse 73   Ht 1.598 m (5' 2.91\")   Wt 74.8 kg (165 lb)   LMP 2019 (Approximate)   SpO2 98%   Breastfeeding? No   BMI 29.31 kg/m    Body mass index is 29.31 kg/m .    General Appearance:  healthy, alert, active, no distress  Cardiovascular:  Regular rate and Rhythm without murmur  Neck: Supple, no adenopathy and thyroid normal  Lungs:  Clear, without wheeze, rale or " rhonchi  Breast: normal breast exam  Abdomen: Benign, Soft, flat, non-tender, No masses, organomegaly, No inguinal nodes and Bowel sounds normoactive.   Pelvic:       - Ext: Vulva and perineum are normal without lesion, mass or discharge        - Urethra: normal without discharge        - Urethral Meatus: normal appearance       - Bladder: no tenderness, no masses       - Vagina: Normal mucosa, no discharge but a wet prep was obtained and submitted for STD screening       - Cervix: normal and nulliparous with 2 IUD strings present and of the correct length       - Uterus:Normal shape, position and consistency        - Adnexa: Normal without masses or tenderness       - Rectal: deferred    A/P:  Well Woman Exam, STD Screening     -  I discussed the new pap recommendations regarding screening.  Explained the rationale for increased intervals between paps.  Questions asked and answered.  She does agree to this regiment.  Pap was obtained and submitted   -  BC: IUD (Mirena) which will be due for removal/replacement in 9/2024   -  Check STD labs were patient request and safe sex measures were discussed   -  She declined a flu vaccine today.   -    Orders Placed This Encounter   Procedures     Pap imaged thin layer screen only - recommended age 21 - 24 years     HIV Antigen Antibody Combo     Hepatitis B surface antigen     Treponema Abs w Reflex to RPR and Titer      -  Encouraged self-breast exam   -  Encouraged low fat diet, regular exercise, and adequate calcium intake.    Za Reagan DO  FACOG, FACS

## 2019-10-30 NOTE — NURSING NOTE
"Chief Complaint   Patient presents with     Physical     Pap and IUD check.        Initial /63   Pulse 73   Ht 1.598 m (5' 2.91\")   Wt 74.8 kg (165 lb)   LMP 2019 (Approximate)   SpO2 98%   Breastfeeding? No   BMI 29.31 kg/m   Estimated body mass index is 29.31 kg/m  as calculated from the following:    Height as of this encounter: 1.598 m (5' 2.91\").    Weight as of this encounter: 74.8 kg (165 lb).  BP completed using cuff size: regular    Questioned patient about current smoking habits.  Pt. has never smoked.          The following HM Due: pap smear      The following patient reported/Care Every where data was sent to:  P ABSTRACT QUALITY INITIATIVES [97100]  n/a      patient has appointment for today              "

## 2019-10-31 LAB
C TRACH DNA SPEC QL NAA+PROBE: NEGATIVE
HBV SURFACE AG SERPL QL IA: NONREACTIVE
HIV 1+2 AB+HIV1 P24 AG SERPL QL IA: NONREACTIVE
N GONORRHOEA DNA SPEC QL NAA+PROBE: NEGATIVE
SPECIMEN SOURCE: NORMAL
SPECIMEN SOURCE: NORMAL
T PALLIDUM AB SER QL: NONREACTIVE

## 2019-11-01 LAB
COPATH REPORT: NORMAL
PAP: NORMAL

## 2020-12-10 ENCOUNTER — VIRTUAL VISIT (OUTPATIENT)
Dept: FAMILY MEDICINE | Facility: CLINIC | Age: 20
End: 2020-12-10
Payer: COMMERCIAL

## 2020-12-10 DIAGNOSIS — Z20.822 EXPOSURE TO COVID-19 VIRUS: Primary | ICD-10-CM

## 2020-12-10 PROCEDURE — 99213 OFFICE O/P EST LOW 20 MIN: CPT | Mod: 95 | Performed by: PHYSICIAN ASSISTANT

## 2020-12-10 NOTE — LETTER
[facility]  [address] [address]  Phone: [phone]    December 10, 2020        Gilma Camp  [address] [address]          To whom it may concern:    RE: Gilma Camp    I recommend Gilma stay off work through December 21, 2020 for medical reasons.     Please contact me for questions or concerns.      Sincerely,        Aminah Reveles PA-C

## 2020-12-10 NOTE — PROGRESS NOTES
"Gilma Camp is a 20 year old female who is being evaluated via a billable video visit.      The patient has been notified of following:     \"This video visit will be conducted via a call between you and your physician/provider. We have found that certain health care needs can be provided without the need for an in-person physical exam.  This service lets us provide the care you need with a video conversation.  If a prescription is necessary we can send it directly to your pharmacy.  If lab work is needed we can place an order for that and you can then stop by our lab to have the test done at a later time.    Video visits are billed at different rates depending on your insurance coverage.  Please reach out to your insurance provider with any questions.    If during the course of the call the physician/provider feels a video visit is not appropriate, you will not be charged for this service.\"    Patient has given verbal consent for Video visit? Yes  How would you like to obtain your AVS? MyChart  If you are dropped from the video visit, the video invite should be resent to: Send to e-mail at: [email or web address]  Will anyone else be joining your video visit? No    Subjective     Gilma Camp is a 20 year old female who presents today via video visit for the following health issues:    Able to connect briefly through video visit, however then had to switch to a phone visit for better connection.     HPI     Exposure to Covid with Negative results  Was tested for Covid 12/9/20  Patient wants to discuss what to do next    Gilma has multiple family members that have tested positive for Covid-19 (4 family members) and she recently had Covid-19 testing herself that has come back negative.  She has no Covid 19 symptoms.    She works at [employer] (into work).      First exposure Monday 12/7/20.     Video Start Time: 2:00 PM        Review of Systems   Constitutional, HEENT, cardiovascular, pulmonary, gi and gu systems " are negative, except as otherwise noted.      Objective           Vitals:  No vitals were obtained today due to virtual visit.    Physical Exam     GENERAL: Healthy, alert and no distress  EYES: Eyes grossly normal to inspection.  No discharge or erythema, or obvious scleral/conjunctival abnormalities.  RESP: No audible wheeze, cough, or visible cyanosis.  No visible retractions or increased work of breathing.    SKIN: Visible skin clear. No significant rash, abnormal pigmentation or lesions.  NEURO: Cranial nerves grossly intact.  Mentation and speech appropriate for age.  PSYCH: Mentation appears normal, affect normal/bright, judgement and insight intact, normal speech and appearance well-groomed.              Assessment & Plan     Exposure to COVID-19 virus  Reviewed exposure history and discussed current quarantine guidelines.  I suggest she stay off work through December 21, 2020.     Monitor for symptoms.                No follow-ups on file.    Aminah Reveles PA-C  [facility]      Video-Visit Details    Type of service:  Phone visit    Video End Time:2:11 PM    Originating Location (pt. Location): Home    Distant Location (provider location):  [facility]     Platform used for Video Visit: Other: amwell at first, but connection poor.  Therefore switched to a phone visit

## 2020-12-11 ENCOUNTER — MYC MEDICAL ADVICE (OUTPATIENT)
Dept: FAMILY MEDICINE | Facility: CLINIC | Age: 20
End: 2020-12-11

## 2020-12-11 NOTE — LETTER
[facility]  [address] [address]  Phone: [phone]    December 10, 2020        Gilma Camp  [address] [address]          To whom it may concern:    RE: Gilma Camp    Gilma Ricoa has been in prolonged, close contact with multiple family members who have tested positive for Covid-19.  For the best interest of her co-workers and customers at [employer] I recommend that she stay quarantined through December 21, 2020.     Please contact me for questions or concerns.      Sincerely,        Aminah Reveles PA-C

## 2020-12-14 NOTE — TELEPHONE ENCOUNTER
Routed to provider to please advise.    [place] BSN-RN  Triage Nurse  [facility]

## 2020-12-20 ENCOUNTER — HEALTH MAINTENANCE LETTER (OUTPATIENT)
Age: 20
End: 2020-12-20

## 2021-03-04 NOTE — PROGRESS NOTES
"   SUBJECTIVE:   CC: Gilma Camp is an 21 year old woman who presents for preventive health visit.     {Split Bill scripting  The purpose of this visit is to discuss your medical history and prevent health problems before you are sick. You may be responsible for a co-pay, coinsurance, or deductible if your visit today includes services such as checking on a sore throat, having an x-ray or lab test, or treating and evaluating a new or existing condition :878967}  Patient has been advised of split billing requirements and indicates understanding: {Yes and No:358840}   Patient would still like to discuss the following concern(s):  1. ***  2. ***  3. ***      Healthy Habits:    Do you get at least three servings of calcium containing foods daily (dairy, green leafy vegetables, etc.)? { :331600::\"yes\"}    Amount of exercise or daily activities, outside of work: { :974914}    Problems taking medications regularly { :031301::\"No\"}    Medication side effects: { :140068::\"No\"}    Have you had an eye exam in the past two years? { :004275}    Do you see a dentist twice per year? { :004080}    Do you have sleep apnea, excessive snoring or daytime drowsiness?{ :634997}      Today's PHQ-2 Score:   PHQ-2 ( 1999 Pfizer) 10/30/2019 7/2/2019   Q1: Little interest or pleasure in doing things 0 0   Q2: Feeling down, depressed or hopeless 0 0   PHQ-2 Score 0 0     {PHQ-2 LOOK IN ASSESSMENTS (Optional) :128000}  Abuse: Current or Past(Physical, Sexual or Emotional)- {YES/NO/NA:958647}  Do you feel safe in your environment? {YES/NO/NA:008202}    Have you ever done Advance Care Planning? (For example, a Health Directive, POLST, or a discussion with a medical provider or your loved ones about your wishes): { :198705}    Social History     Tobacco Use     Smoking status: Never Smoker     Smokeless tobacco: Never Used   Substance Use Topics     Alcohol use: Yes     Comment: occ.      If you drink alcohol do you typically have >3 drinks per " "day or >7 drinks per week? {ETOH :511436}                     Reviewed orders with patient.  Reviewed health maintenance and updated orders accordingly - {Yes/No:476954::\"Yes\"}  {Chronicprobdata (Optional):156144}        Pertinent mammograms are reviewed under the imaging tab.  History of abnormal Pap smear: {PAP HX:690254}  PAP / HPV 10/30/2019   PAP NIL     Reviewed and updated as needed this visit by clinical staff                 Reviewed and updated as needed this visit by Provider                {HISTORY OPTIONS (Optional):669664}    ROS:  { :211814}    OBJECTIVE:   There were no vitals taken for this visit.  EXAM:  {Exam Choices:070331}    {Diagnostic Test Results (Optional):759811::\"Diagnostic Test Results:\",\"Labs reviewed in Epic\"}    ASSESSMENT/PLAN:   {Diag Picklist:239240}    Patient has been advised of split billing requirements and indicates understanding: {YES / NO:107899::\"Yes\"}  COUNSELING:   {FEMALE COUNSELING MESSAGES:283359::\"Reviewed preventive health counseling, as reflected in patient instructions\"}    Estimated body mass index is 29.31 kg/m  as calculated from the following:    Height as of 10/30/19: 1.598 m (5' 2.91\").    Weight as of 10/30/19: 74.8 kg (165 lb).    {Weight Management Plan (ACO) Complete if BMI is abnormal-  Ages 18-64  BMI >24.9.  Age 65+ with BMI <23 or >30 (Optional):432955}    She reports that she has never smoked. She has never used smokeless tobacco.      Counseling Resources:  ATP IV Guidelines  Pooled Cohorts Equation Calculator  Breast Cancer Risk Calculator  BRCA-Related Cancer Risk Assessment: FHS-7 Tool  FRAX Risk Assessment  ICSI Preventive Guidelines  Dietary Guidelines for Americans, 2010  USDA's MyPlate  ASA Prophylaxis  Lung CA Screening    Jacqueline Carroll NP  [facility]  "

## 2021-03-04 NOTE — PATIENT INSTRUCTIONS
Preventive Health Recommendations  Female Ages 21 to 25     Yearly exam:     See your health care provider every year in order to  o Review health changes.   o Discuss preventive care.    o Review your medicines if your doctor has prescribed any.      You should be tested each year for STDs (sexually transmitted diseases).       Talk to your provider about how often you should have cholesterol testing.      Get a Pap test every three years. If you have an abnormal result, your doctor may have you test more often.      If you are at risk for diabetes, you should have a diabetes test (fasting glucose).     Shots:     Get a flu shot each year.     Get a tetanus shot every 10 years.     Consider getting the shot (vaccine) that prevents cervical cancer (Gardasil).    Nutrition:     Eat at least 5 servings of fruits and vegetables each day.    Eat whole-grain bread, whole-wheat pasta and brown rice instead of white grains and rice.    Get adequate Calcium and Vitamin D.     Lifestyle    Exercise at least 150 minutes a week each week (30 minutes a day, 5 days a week). This will help you control your weight and prevent disease.    Limit alcohol to one drink per day.    No smoking.     Wear sunscreen to prevent skin cancer.    See your dentist every six months for an exam and cleaning.  Patient Education     Controlling Adult Acne     Look for water-based, oil-free skin care products. These are less likely to clog your pores.   Your acne treatment will work best if you follow your treatment plan. Be patient. Acne often takes months to improve, not days or weeks. Ask your healthcare provider when you can expect your skin to look better. If you don t see results by your goal date, call your healthcare provider. He or she may want to give you some other type of treatment.   Using skin care products and cosmetics  Besides following your treatment plan, take care in choosing skin care products and cosmetics. The following tips  may help:   Choose gentle, oil-free soaps and facial cleansers.  Don't use harsh acne scrubs, cleansers, or astringents. They can irritate your skin and make acne worse.  Ask your healthcare provider before buying over-the-counter acne treatments. Some of these, such as those with benzoyl peroxide or salicylic acid, can work. But they often have side effects, such as skin getting too dry with treatments that are too strong.  Read labels on makeup and moisturizers. Choose those that are water based and oil free. Look for the term noncomedogenic. It means that the product won t clog your pores.  Caring for your skin  The right skin care routine can help keep your skin healthy. To take good care of your skin, try these tips:   Gently wash affected skin twice a day with a mild cleanser. Using your fingertips, smooth the cleanser over your skin. Rinse your skin well. Pat it dry.  If your healthcare provider has approved any over-the-counter acne medicine, use as directed. Use it after you wash your skin. Apply the medicine to all areas where you get acne. Don t just treat acne you can see now. New blemishes may be in progress but not in view yet. Treat all the skin.  Don t squeeze or pick blemishes. Acne blemishes may heal on their own. But squeezing can cause scarring. Scars will remain after acne blemishes heal.  Using sponges, brushes, or other abrasive tools to clean your skin can irritate the skin. Don't use them.  If you use soft sponges or cloths to apply makeup, keep them clean.  Try not to touch your face.  If possible, don't wear clothing or equipment that blocks or rubs the skin with acne.  Getting good results  Learning more about acne is the first step toward controlling this condition. Acne that s being treated often gets worse at first before it improves. But with proper treatment and skin care, you can manage your acne and feel better about your skin.   [employer] last reviewed this educational content on  7/1/2020 2000-2020 [employer]. All rights reserved. This information is not intended as a substitute for professional medical care. Always follow your healthcare professional's instructions.           Patient Education     Adult Acne  If your skin is erupting with blemishes that you thought could only affect a teenager, you may have adult acne. This is acne in people over the age of 25. Acne in teenagers is more common in teen boys. Acne in adults is more common in women.   Acne is the term for oil-clogged pores. Pores are tiny openings on the skin that become inflamed and form blemishes. Adult acne blemishes show up mainly on the face. In women, blemishes tend to show up around the chin, mouth, jawline, and neck. In men, acne often affects the entire face. But the trunk and upper arms can also have acne.   Acne can be treated. Treatments can also decrease the scarring and changes to skin color caused by acne.   What causes acne?  Male hormones (androgens) may cause acne in some people. Women with certain conditions such as polycystic ovarian syndrome (PCOS) may make too much male hormones. Acne in women often may happen just before their menstrual periods. If you had acne when you were a teenager or if others in your family have had acne, you may be at more risk for adult acne.   Types of acne  Acne happens when certain hair follicles are damaged. One or more of 4 things happen:  The hair follicle is blocked by dead cells and oil (sebum)  The follicle makes more oil (sebum) than normal  Bacteria (P. acnes) grow in the follicle  The follicle becomes irritated (inflamed)  Four types of blemishes can appear:  Whiteheads are round, white blemishes that form when hair follicles become clogged.  Blackheads are round, dark blemishes that form when whiteheads reach the skin s surface and touch air.  Pimples are red, swollen bumps that form when plugged follicle walls break near the skin s surface.  Deep  cysts are pus-filled pimples. They form when plugged follicle walls break deep within the skin. Acne cysts are often large and painful. In some cases, they also cause scars.  How treatment can help  The goals of treatment are to keep new acne blemishes from forming and to prevent scarring and changes in skin color. You will usually need a combination of treatments. You may need to use a treatment for at least 2 months to see if it works. This is because acne lesions take at least 8 weeks to develop.     Your treatment will depend on how serious your acne is. You and your healthcare provider can discuss the best way to treat and control it. In most cases, acne treatment includes:   Good skin care that doesn t damage or irritate skin  Medicines put on the skin (topical)  Antibiotics, hormones, or both  Often you will need to take several medicines at first. For some treatments, women must use a birth control method so they won t get pregnant while being treated.   Your healthcare provider may also remove blemishes or give you injections. If you have acne scars, you may need surgery or medicines to help improve the way your skin looks. Be sure you understand your treatment plan and any side effects it might cause. You will play an important role in the success of your treatment.   [employer] last reviewed this educational content on 7/1/2020 2000-2020 [employer]. All rights reserved. This information is not intended as a substitute for professional medical care. Always follow your healthcare professional's instructions.

## 2021-03-10 ASSESSMENT — ENCOUNTER SYMPTOMS
ABDOMINAL PAIN: 0
NAUSEA: 0
EYE PAIN: 0
FREQUENCY: 0
HEADACHES: 0
SHORTNESS OF BREATH: 0
WEAKNESS: 0
FEVER: 0
DIARRHEA: 0
CHILLS: 0
JOINT SWELLING: 0
BREAST MASS: 0
ARTHRALGIAS: 0
SORE THROAT: 0
COUGH: 0
DIZZINESS: 0
PARESTHESIAS: 0
DYSURIA: 0
HEMATOCHEZIA: 0
HEARTBURN: 0
CONSTIPATION: 1
HEMATURIA: 0
PALPITATIONS: 0
MYALGIAS: 0

## 2021-03-10 NOTE — PROGRESS NOTES
SUBJECTIVE:   CC: Gilma Camp is an 21 year old woman who presents for preventive health visit.       Patient has been advised of split billing requirements and indicates understanding: Yes   Patient would still like to discuss the following concern(s):  1. Acne questions- was talking with dermatologist online who suggested she try spironolactone for her acne as it is mostly around her lower chin= hormonal.   2. Low back pain, midline, usually during winter, since having her son 6 years ago. Comes and goes, has not been there for awhile. Does not radiate into butt/legs, if radiates at all radiates up spine. No night sweats, unexplained weight loss.       Healthy Habits:     Getting at least 3 servings of Calcium per day:  Yes    Bi-annual eye exam:  NO    Dental care twice a year:  Yes    Sleep apnea or symptoms of sleep apnea:  None    Diet:  Regular (no restrictions)    Frequency of exercise:  2-3 days/week    Duration of exercise:  15-30 minutes    Taking medications regularly:  Yes    Medication side effects:  Not applicable    PHQ-2 Total Score: 0    Additional concerns today:  No          Today's PHQ-2 Score:   PHQ-2 ( 1999 [employer]) 3/10/2021   Q1: Little interest or pleasure in doing things 0   Q2: Feeling down, depressed or hopeless 0   PHQ-2 Score 0   Q1: Little interest or pleasure in doing things Not at all   Q2: Feeling down, depressed or hopeless Not at all   PHQ-2 Score 0       Abuse: Current or Past (Physical, Sexual or Emotional) - No  Do you feel safe in your environment? Yes    Have you ever done Advance Care Planning? (For example, a Health Directive, POLST, or a discussion with a medical provider or your loved ones about your wishes): No, advance care planning information given to patient to review.  Patient declined advance care planning discussion at this time.    Social History     Tobacco Use     Smoking status: Never Smoker     Smokeless tobacco: Never Used   Substance Use Topics      Alcohol use: Yes     Comment: occ.          Alcohol Use 3/10/2021   Prescreen: >3 drinks/day or >7 drinks/week? No   Prescreen: >3 drinks/day or >7 drinks/week? -         Reviewed orders with patient.  Reviewed health maintenance and updated orders accordingly - Yes  Lab work is in process  Labs reviewed in EPIC  BP Readings from Last 3 Encounters:   21 117/74   10/30/19 102/63   09/10/19 112/63    Wt Readings from Last 3 Encounters:   21 75.5 kg (166 lb 6.4 oz)   10/30/19 74.8 kg (165 lb)   09/10/19 74.7 kg (164 lb 11.2 oz)                  Patient Active Problem List   Diagnosis     Bilateral low back pain without sciatica, unspecified chronicity     Metrorrhagia     IUD (intrauterine device) in place     Past Surgical History:   Procedure Laterality Date      SECTION         Social History     Tobacco Use     Smoking status: Never Smoker     Smokeless tobacco: Never Used   Substance Use Topics     Alcohol use: Yes     Comment: occ.      Family History   Problem Relation Age of Onset     No Known Problems Mother      No Known Problems Father      No Known Problems Brother      No Known Problems Sister      No Known Problems Brother      No Known Problems Brother      No Known Problems Sister      Unknown/Adopted Paternal Grandmother      Unknown/Adopted Paternal Grandfather          Current Outpatient Medications   Medication Sig Dispense Refill     diclofenac (VOLTAREN) 1 % topical gel Apply 4 g topically 4 times daily 150 g 1     levonorgestrel (MIRENA) 20 MCG/24HR IUD 1 each by Intrauterine route once       spironolactone (ALDACTONE) 25 MG tablet Take 1 tablet (25 mg) by mouth 2 times daily 180 tablet 1     No Known Allergies  Recent Labs   Lab Test 19  1200 17  1655   TSH 1.13 0.86            History of abnormal Pap smear: NO - age 21-29 PAP every 3 years recommended  PAP / HPV 10/30/2019   PAP NIL     Reviewed and updated as needed this visit by clinical staff  Tobacco   "Allergies  Meds  Problems  Med Hx  Surg Hx  Fam Hx  Soc Hx          Reviewed and updated as needed this visit by Provider  Tobacco  Allergies  Meds  Problems  Med Hx  Surg Hx  Fam Hx         Past Medical History:   Diagnosis Date     NO ACTIVE PROBLEMS       Past Surgical History:   Procedure Laterality Date      SECTION       OB History    Para Term  AB Living   1 1 0 1 0 1   SAB TAB Ectopic Multiple Live Births   0 0 0 0 1      # Outcome Date GA Lbr Tayo/2nd Weight Sex Delivery Anes PTL Lv   1  14 26w0d  0.907 kg (2 lb) M CS-Classical Gen Y ROLAN      Complications: Footling breech presentation,  labor      Name: Fritz      Apgar1: 2  Apgar5: 3       Review of Systems  CONSTITUTIONAL: NEGATIVE for fever, chills, change in weight  INTEGUMENTARU/SKIN: NEGATIVE for worrisome rashes, moles or lesions  EYES: NEGATIVE for vision changes or irritation  ENT: NEGATIVE for ear, mouth and throat problems  RESP: NEGATIVE for significant cough or SOB  BREAST: NEGATIVE for masses, tenderness or discharge  CV: NEGATIVE for chest pain, palpitations or peripheral edema  GI: NEGATIVE for nausea, abdominal pain, heartburn, or change in bowel habits  : NEGATIVE for unusual urinary or vaginal symptoms. Periods are absent d/t IUD- occasional spotting.   MUSCULOSKELETAL: NEGATIVE for significant arthralgias or myalgia POSITIVE for intermittent back pain  NEURO: NEGATIVE for weakness, dizziness or paresthesias  ENDOCRINE: NEGATIVE for temperature intolerance, skin/hair changes  HEME/ALLERGY/IMMUNE: NEGATIVE for bleeding problems  PSYCHIATRIC: NEGATIVE for changes in mood or affect     OBJECTIVE:   /74   Pulse 62   Temp 97.3  F (36.3  C) (Tympanic)   Resp 16   Ht 1.57 m (5' 1.81\")   Wt 75.5 kg (166 lb 6.4 oz)   SpO2 100%   BMI 30.62 kg/m    Physical Exam  GENERAL: healthy, alert and no distress  EYES: Eyes grossly normal to inspection, PERRL and conjunctivae and " sclerae normal  HENT: ear canals and TM's normal, nose and mouth without ulcers or lesions  NECK: no adenopathy, no asymmetry, masses, or scars and thyroid normal to palpation  RESP: lungs clear to auscultation - no rales, rhonchi or wheezes  BREAST: deferred per guidelines- asymptomatic per pt  CV: regular rate and rhythm, normal S1 S2, no S3 or S4, no murmur, click or rub, no peripheral edema and peripheral pulses strong  ABDOMEN: soft, nontender, no hepatosplenomegaly, no masses and bowel sounds normal   (female): deferred, no concerns  MS: no gross musculoskeletal defects noted, no edema, no CVA tenderness  SKIN: no suspicious lesions or rashes  NEURO: Normal strength and tone, cranial nerves intact, mentation intact and speech normal  PSYCH: mentation appears normal, affect normal/bright  LYMPH: no cervical, supraclavicular, axillary adenopathy    Diagnostic Test Results:  Labs reviewed in Epic  See orders    ASSESSMENT/PLAN:       ICD-10-CM    1. Routine general medical examination at a health care facility  Z00.00    2. Acne, unspecified acne type  L70.9 spironolactone (ALDACTONE) 25 MG tablet   3. Screening for diabetes mellitus  Z13.1 Hemoglobin A1c   4. Screening for thyroid disorder  Z13.29 TSH with free T4 reflex   5. Lipid screening  Z13.220 Lipid panel reflex to direct LDL Non-fasting   6. Screening examination for venereal disease  Z11.3 Neisseria gonorrhoeae PCR     Chlamydia trachomatis PCR   7. Encounter for hepatitis C screening test for low risk patient  Z11.59 Hepatitis C Screen Reflex to HCV RNA Quant and Genotype   8. Midline low back pain without sciatica, unspecified chronicity  M54.5 diclofenac (VOLTAREN) 1 % topical gel   9. IUD (intrauterine device) in place  Z97.5        Patient has been advised of split billing requirements and indicates understanding: Yes  COUNSELING:  Reviewed preventive health counseling, as reflected in patient instructions    Estimated body mass index is 30.62  "kg/m  as calculated from the following:    Height as of this encounter: 1.57 m (5' 1.81\").    Weight as of this encounter: 75.5 kg (166 lb 6.4 oz).    Weight management plan: Discussed healthy diet and exercise guidelines    She reports that she has never smoked. She has never used smokeless tobacco.      Counseling Resources:  ATP IV Guidelines  Pooled Cohorts Equation Calculator  Breast Cancer Risk Calculator  BRCA-Related Cancer Risk Assessment: FHS-7 Tool  FRAX Risk Assessment  ICSI Preventive Guidelines  Dietary Guidelines for Americans, 2010  USDA's MyPlate  ASA Prophylaxis  Lung CA Screening    YOANDY Meléndez  [facility] GENEVIEVE  "

## 2021-03-11 ENCOUNTER — OFFICE VISIT (OUTPATIENT)
Dept: FAMILY MEDICINE | Facility: CLINIC | Age: 21
End: 2021-03-11
Payer: COMMERCIAL

## 2021-03-11 VITALS
TEMPERATURE: 97.3 F | WEIGHT: 166.4 LBS | HEART RATE: 62 BPM | SYSTOLIC BLOOD PRESSURE: 117 MMHG | DIASTOLIC BLOOD PRESSURE: 74 MMHG | RESPIRATION RATE: 16 BRPM | HEIGHT: 62 IN | OXYGEN SATURATION: 100 % | BODY MASS INDEX: 30.62 KG/M2

## 2021-03-11 DIAGNOSIS — Z13.1 SCREENING FOR DIABETES MELLITUS: ICD-10-CM

## 2021-03-11 DIAGNOSIS — M54.50 MIDLINE LOW BACK PAIN WITHOUT SCIATICA, UNSPECIFIED CHRONICITY: ICD-10-CM

## 2021-03-11 DIAGNOSIS — Z00.00 ROUTINE GENERAL MEDICAL EXAMINATION AT A HEALTH CARE FACILITY: Primary | ICD-10-CM

## 2021-03-11 DIAGNOSIS — L70.9 ACNE, UNSPECIFIED ACNE TYPE: ICD-10-CM

## 2021-03-11 DIAGNOSIS — Z13.29 SCREENING FOR THYROID DISORDER: ICD-10-CM

## 2021-03-11 DIAGNOSIS — Z97.5 IUD (INTRAUTERINE DEVICE) IN PLACE: ICD-10-CM

## 2021-03-11 DIAGNOSIS — Z11.3 SCREENING EXAMINATION FOR VENEREAL DISEASE: ICD-10-CM

## 2021-03-11 DIAGNOSIS — Z11.59 ENCOUNTER FOR HEPATITIS C SCREENING TEST FOR LOW RISK PATIENT: ICD-10-CM

## 2021-03-11 DIAGNOSIS — Z13.220 LIPID SCREENING: ICD-10-CM

## 2021-03-11 LAB
CHOLEST SERPL-MCNC: 179 MG/DL
HBA1C MFR BLD: 5 % (ref 0–5.6)
HDLC SERPL-MCNC: 39 MG/DL
LDLC SERPL CALC-MCNC: 127 MG/DL
NONHDLC SERPL-MCNC: 140 MG/DL
TRIGL SERPL-MCNC: 64 MG/DL
TSH SERPL DL<=0.005 MIU/L-ACNC: 0.86 MU/L (ref 0.4–4)

## 2021-03-11 PROCEDURE — 87491 CHLMYD TRACH DNA AMP PROBE: CPT | Performed by: NURSE PRACTITIONER

## 2021-03-11 PROCEDURE — 86803 HEPATITIS C AB TEST: CPT | Performed by: NURSE PRACTITIONER

## 2021-03-11 PROCEDURE — 83036 HEMOGLOBIN GLYCOSYLATED A1C: CPT | Performed by: NURSE PRACTITIONER

## 2021-03-11 PROCEDURE — 84443 ASSAY THYROID STIM HORMONE: CPT | Performed by: NURSE PRACTITIONER

## 2021-03-11 PROCEDURE — 36415 COLL VENOUS BLD VENIPUNCTURE: CPT | Performed by: NURSE PRACTITIONER

## 2021-03-11 PROCEDURE — 99395 PREV VISIT EST AGE 18-39: CPT | Performed by: NURSE PRACTITIONER

## 2021-03-11 PROCEDURE — 99213 OFFICE O/P EST LOW 20 MIN: CPT | Mod: 25 | Performed by: NURSE PRACTITIONER

## 2021-03-11 PROCEDURE — 80061 LIPID PANEL: CPT | Performed by: NURSE PRACTITIONER

## 2021-03-11 PROCEDURE — 87591 N.GONORRHOEAE DNA AMP PROB: CPT | Performed by: NURSE PRACTITIONER

## 2021-03-11 RX ORDER — SPIRONOLACTONE 25 MG/1
25 TABLET ORAL 2 TIMES DAILY
Qty: 180 TABLET | Refills: 1 | Status: SHIPPED | OUTPATIENT
Start: 2021-03-11 | End: 2021-09-15

## 2021-03-11 ASSESSMENT — MIFFLIN-ST. JEOR: SCORE: 1460.04

## 2021-03-12 LAB
C TRACH DNA SPEC QL NAA+PROBE: NEGATIVE
HCV AB SERPL QL IA: NONREACTIVE
N GONORRHOEA DNA SPEC QL NAA+PROBE: NEGATIVE
SPECIMEN SOURCE: NORMAL
SPECIMEN SOURCE: NORMAL

## 2021-03-16 NOTE — RESULT ENCOUNTER NOTE
Tutu Dillard,    Thank you for your recent office visit.    Here are your recent results.  Normal non-fasting labs.     Feel free to contact me via [employer] or call the clinic at [phone].    Sincerely,    DARINEL Oconnell, FNP-BC

## 2021-03-23 ENCOUNTER — TELEPHONE (OUTPATIENT)
Dept: FAMILY MEDICINE | Facility: CLINIC | Age: 21
End: 2021-03-23

## 2021-03-23 NOTE — TELEPHONE ENCOUNTER
Prior Authorization Retail Medication Request    Medication/Dose: diclofenac (VOLTAREN) 1 % topical gel  ICD code (if different than what is on RX):  M54.5  Previously Tried and Failed:    Rationale:      Insurance Name:  [employer]  Insurance ID:  [id number]      Pharmacy Information (if different than what is on RX)  Name:  [facility]  Phone:  [phone]

## 2021-03-25 NOTE — TELEPHONE ENCOUNTER
PA Initiation    Medication: diclofenac (VOLTAREN) 1 % topical gel  Insurance Company: [employer] - Phone [phone] Fax [phone]  Pharmacy Filling the Rx: [id number] - [place] - [address] AT [facility] & [place]  Filling Pharmacy Phone: [phone]  Filling Pharmacy Fax: [phone]  Start Date: 3/25/2021

## 2021-03-25 NOTE — TELEPHONE ENCOUNTER
PRIOR AUTHORIZATION DENIED    Medication: diclofenac (VOLTAREN) 1 % topical gel    Denial Date: 3/25/2021    Denial Rationale:       Preferred generic NSAIDs include: diclofenac sodium (IR and ER), diclofenac potassium, diclofenac sodiumand misoprostol,diclofenac sodium topical solution 1.5%, etodolac (IR and ER), flurbiprofen, ibuprofen, indomethacin (IR and ER), ketoprofen IR 50 mg and 75mg, ketorolac (tablets), meclofenamate, mefenamic acid, meloxicam tablets, nabumetone, naproxen (generics for Naprelan and Naproxen Suspension are NOT preferred), oxaprozin, piroxicam, sulindac, and tolmetin (generics for tolmetin 400 mg and 600 mg are not preferred). PLEASE NOTE: Celecoxib is accepted as a generic NSAID.    Appeal Information: If provider would like to appeal this decision we will need a detailed letter of medical necessity to start the process. Then re-route this request back to the PA pool.

## 2021-09-15 ENCOUNTER — OFFICE VISIT (OUTPATIENT)
Dept: FAMILY MEDICINE | Facility: CLINIC | Age: 21
End: 2021-09-15
Payer: COMMERCIAL

## 2021-09-15 ENCOUNTER — ANCILLARY PROCEDURE (OUTPATIENT)
Dept: GENERAL RADIOLOGY | Facility: CLINIC | Age: 21
End: 2021-09-15
Attending: PHYSICIAN ASSISTANT
Payer: COMMERCIAL

## 2021-09-15 VITALS
SYSTOLIC BLOOD PRESSURE: 113 MMHG | HEART RATE: 63 BPM | BODY MASS INDEX: 28.07 KG/M2 | RESPIRATION RATE: 16 BRPM | OXYGEN SATURATION: 99 % | WEIGHT: 158.4 LBS | HEIGHT: 63 IN | TEMPERATURE: 98.6 F | DIASTOLIC BLOOD PRESSURE: 76 MMHG

## 2021-09-15 DIAGNOSIS — R00.2 PALPITATIONS: Primary | ICD-10-CM

## 2021-09-15 DIAGNOSIS — R05.9 COUGH: ICD-10-CM

## 2021-09-15 LAB
ANION GAP SERPL CALCULATED.3IONS-SCNC: 9 MMOL/L (ref 3–14)
BASOPHILS # BLD AUTO: 0 10E3/UL (ref 0–0.2)
BASOPHILS NFR BLD AUTO: 0 %
BUN SERPL-MCNC: 22 MG/DL (ref 7–30)
CALCIUM SERPL-MCNC: 9 MG/DL (ref 8.5–10.1)
CHLORIDE BLD-SCNC: 107 MMOL/L (ref 94–109)
CO2 SERPL-SCNC: 22 MMOL/L (ref 20–32)
CREAT SERPL-MCNC: 0.66 MG/DL (ref 0.52–1.04)
EOSINOPHIL # BLD AUTO: 0.1 10E3/UL (ref 0–0.7)
EOSINOPHIL NFR BLD AUTO: 1 %
ERYTHROCYTE [DISTWIDTH] IN BLOOD BY AUTOMATED COUNT: 12.3 % (ref 10–15)
GFR SERPL CREATININE-BSD FRML MDRD: >90 ML/MIN/1.73M2
GLUCOSE BLD-MCNC: 84 MG/DL (ref 70–99)
HCT VFR BLD AUTO: 37 % (ref 35–47)
HGB BLD-MCNC: 12.7 G/DL (ref 11.7–15.7)
LYMPHOCYTES # BLD AUTO: 2.5 10E3/UL (ref 0.8–5.3)
LYMPHOCYTES NFR BLD AUTO: 26 %
MCH RBC QN AUTO: 30.8 PG (ref 26.5–33)
MCHC RBC AUTO-ENTMCNC: 34.3 G/DL (ref 31.5–36.5)
MCV RBC AUTO: 90 FL (ref 78–100)
MONOCYTES # BLD AUTO: 0.4 10E3/UL (ref 0–1.3)
MONOCYTES NFR BLD AUTO: 5 %
NEUTROPHILS # BLD AUTO: 6.4 10E3/UL (ref 1.6–8.3)
NEUTROPHILS NFR BLD AUTO: 67 %
PLATELET # BLD AUTO: 323 10E3/UL (ref 150–450)
POTASSIUM BLD-SCNC: 3.8 MMOL/L (ref 3.4–5.3)
RBC # BLD AUTO: 4.12 10E6/UL (ref 3.8–5.2)
SODIUM SERPL-SCNC: 138 MMOL/L (ref 133–144)
TSH SERPL DL<=0.005 MIU/L-ACNC: 0.86 MU/L (ref 0.4–4)
WBC # BLD AUTO: 9.6 10E3/UL (ref 4–11)

## 2021-09-15 PROCEDURE — 71046 X-RAY EXAM CHEST 2 VIEWS: CPT | Performed by: RADIOLOGY

## 2021-09-15 PROCEDURE — 99213 OFFICE O/P EST LOW 20 MIN: CPT | Performed by: PHYSICIAN ASSISTANT

## 2021-09-15 PROCEDURE — 85025 COMPLETE CBC W/AUTO DIFF WBC: CPT | Performed by: PHYSICIAN ASSISTANT

## 2021-09-15 PROCEDURE — 36415 COLL VENOUS BLD VENIPUNCTURE: CPT | Performed by: PHYSICIAN ASSISTANT

## 2021-09-15 PROCEDURE — 80048 BASIC METABOLIC PNL TOTAL CA: CPT | Performed by: PHYSICIAN ASSISTANT

## 2021-09-15 PROCEDURE — 93000 ELECTROCARDIOGRAM COMPLETE: CPT | Performed by: PHYSICIAN ASSISTANT

## 2021-09-15 PROCEDURE — 84443 ASSAY THYROID STIM HORMONE: CPT | Performed by: PHYSICIAN ASSISTANT

## 2021-09-15 ASSESSMENT — MIFFLIN-ST. JEOR: SCORE: 1435.01

## 2021-09-15 ASSESSMENT — PAIN SCALES - GENERAL: PAINLEVEL: MILD PAIN (2)

## 2021-09-15 NOTE — PROGRESS NOTES
"Raiza Dillard is a 21 year old who presents for the following health issues    HPI     Chest Pain  Onset/Duration: 2-3 weeks  Description:   Location: right side  Character: tight  Radiation: No  Duration: 20-30 seconds off and on   Intensity: mild  Progression of Symptoms: same  Accompanying Signs & Symptoms:  Shortness of breath: YES  Sweating: no  Nausea/vomiting: no  Lightheadedness: YES- at times  Palpitations: YES  Fever/Chills: no  Cough: YES           Heartburn: no  History:   Family history of heart disease: no  Tobacco use: no  Previous similar symptoms: no   Precipitating factors:   Worse with exertion: not sure  Worse with deep breaths: no           Related to eating: no           Better with burping: no  Alleviating factors: None  Therapies tried and outcome: None  Some coughing. No profound wheezing or hoarseness.   No gerd symptoms. Some chest tightness. Some episodes of palpitations/tachycardia. No profound chest pain . No dizziness. No weight changes. No fatigue.   No fhx of cad or valvular abnormalities   Anna Motta CMA    Zio patch placed on patient in clinic on 9/15/21. Patient was instructed to wear patch for 7 days per provider.     Went through instructions with patient regarding care, booklet documentation, returning device, and [facility].     Patient agreed with plan and was sent home with instructions, brochure, log book and pre addressed return box. Jacqueline Ayoub CMA         Review of Systems   Constitutional, HEENT, cardiovascular, pulmonary, GI, , musculoskeletal, neuro, skin, endocrine and psych systems are negative, except as otherwise noted.      Objective    /76 (BP Location: Right arm, Patient Position: Sitting, Cuff Size: Adult Regular)   Pulse 63   Temp 98.6  F (37  C) (Tympanic)   Resp 16   Ht 1.588 m (5' 2.52\")   Wt 71.8 kg (158 lb 6.4 oz)   SpO2 99%   BMI 28.49 kg/m    Body mass index is 28.49 kg/m .  Physical " Exam     Eye exam - right eye normal lid, conjunctiva, cornea, pupil and fundus, left eye normal lid, conjunctiva, cornea, pupil and fundus.  Thyroid not palpable, not enlarged, no nodules detected.  CHEST:chest clear to IPPA, no tachypnea, retractions or cyanosis and S1, S2 normal, no murmur, no gallop, rate regular.  No leg edema or swelling.   No chest wall pain with palpation.    Gilma was seen today for chest pain and breathing problem.    Diagnoses and all orders for this visit:    Palpitations  -     EKG 12-lead complete w/read - Clinics  -     CBC with platelets and differential; Future  -     TSH with free T4 reflex; Future  -     Basic metabolic panel  (Ca, Cl, CO2, Creat, Gluc, K, Na, BUN); Future  -     Basic metabolic panel  (Ca, Cl, CO2, Creat, Gluc, K, Na, BUN)  -     TSH with free T4 reflex  -     CBC with platelets and differential  -     Leadless EKG Monitor 3 to 7 Days; Future    Cough  -     XR Chest 2 Views; Future    Other orders  -     REVIEW OF HEALTH MAINTENANCE PROTOCOL ORDERS  -     Cancel: HC FLU VAC PRESRV FREE QUAD SPLIT VIR > 6 MONTHS IM ([id number])      Advised supportive and symptomatic treatment.  Follow up with Provider - if condition persists or worsens.   work on lifestyle modification

## 2021-09-16 ENCOUNTER — ANCILLARY PROCEDURE (OUTPATIENT)
Dept: CARDIOLOGY | Facility: CLINIC | Age: 21
End: 2021-09-16
Attending: PHYSICIAN ASSISTANT
Payer: COMMERCIAL

## 2021-09-16 DIAGNOSIS — R00.2 PALPITATIONS: ICD-10-CM

## 2021-09-16 NOTE — PROGRESS NOTES
Zio patch placed on patient in clinic on 9/15/21. Patient was instructed to wear patch for 7 days per provider.     Went through instructions with patient regarding care, booklet documentation, returning device, and contacting [facility] with problems with device.     Patient agreed with plan and was sent home with instructions, brochure, log book and pre addressed return box. Jacqueline Ayoub, CMA

## 2021-10-03 ENCOUNTER — HEALTH MAINTENANCE LETTER (OUTPATIENT)
Age: 21
End: 2021-10-03

## 2022-05-15 ENCOUNTER — HEALTH MAINTENANCE LETTER (OUTPATIENT)
Age: 22
End: 2022-05-15

## 2022-09-10 ENCOUNTER — HEALTH MAINTENANCE LETTER (OUTPATIENT)
Age: 22
End: 2022-09-10

## 2023-06-03 ENCOUNTER — HEALTH MAINTENANCE LETTER (OUTPATIENT)
Age: 23
End: 2023-06-03

## 2023-10-18 ENCOUNTER — OFFICE VISIT (OUTPATIENT)
Dept: MIDWIFE SERVICES | Facility: CLINIC | Age: 23
End: 2023-10-18
Payer: COMMERCIAL

## 2023-10-18 VITALS
WEIGHT: 149 LBS | HEIGHT: 62 IN | HEART RATE: 51 BPM | TEMPERATURE: 97.7 F | DIASTOLIC BLOOD PRESSURE: 69 MMHG | BODY MASS INDEX: 27.42 KG/M2 | SYSTOLIC BLOOD PRESSURE: 106 MMHG

## 2023-10-18 DIAGNOSIS — Z01.419 WELL WOMAN EXAM WITH ROUTINE GYNECOLOGICAL EXAM: Primary | ICD-10-CM

## 2023-10-18 DIAGNOSIS — Z12.4 SCREENING FOR MALIGNANT NEOPLASM OF CERVIX: ICD-10-CM

## 2023-10-18 DIAGNOSIS — K59.00 CONSTIPATION, UNSPECIFIED CONSTIPATION TYPE: ICD-10-CM

## 2023-10-18 DIAGNOSIS — Z11.3 SCREEN FOR STD (SEXUALLY TRANSMITTED DISEASE): ICD-10-CM

## 2023-10-18 PROCEDURE — G0145 SCR C/V CYTO,THINLAYER,RESCR: HCPCS | Performed by: ADVANCED PRACTICE MIDWIFE

## 2023-10-18 PROCEDURE — 99385 PREV VISIT NEW AGE 18-39: CPT | Performed by: ADVANCED PRACTICE MIDWIFE

## 2023-10-18 PROCEDURE — 99213 OFFICE O/P EST LOW 20 MIN: CPT | Mod: 25 | Performed by: ADVANCED PRACTICE MIDWIFE

## 2023-10-18 PROCEDURE — 87491 CHLMYD TRACH DNA AMP PROBE: CPT | Performed by: ADVANCED PRACTICE MIDWIFE

## 2023-10-18 PROCEDURE — 87591 N.GONORRHOEAE DNA AMP PROB: CPT | Performed by: ADVANCED PRACTICE MIDWIFE

## 2023-10-18 RX ORDER — POLYETHYLENE GLYCOL 3350 17 G/17G
1 POWDER, FOR SOLUTION ORAL DAILY
Qty: 238 G | Refills: 3 | Status: SHIPPED | OUTPATIENT
Start: 2023-10-18

## 2023-10-18 RX ORDER — CHLORHEXIDINE GLUCONATE 4 %
1 LIQUID (ML) TOPICAL DAILY
Qty: 100 TABLET | Refills: 3 | Status: SHIPPED | OUTPATIENT
Start: 2023-10-18

## 2023-10-18 RX ORDER — DOCUSATE SODIUM 100 MG/1
100 CAPSULE, LIQUID FILLED ORAL 2 TIMES DAILY
Qty: 90 CAPSULE | Refills: 3 | Status: SHIPPED | OUTPATIENT
Start: 2023-10-18

## 2023-10-18 NOTE — PROGRESS NOTES
Gilma is a 23 year old  female who presents for annual exam. She feels well. Her only complaint is chronic constipation. She used miralax in the past, and that worked well. She has increased her water intake significantly, as she is attempting to be more healthy and lose some weight. Increased water has not helped her constipation. She has a Mirena IUD that was placed in 2019, she is happy with this method.     Menses are pt has IUD in place and  lasting  days.  Menses flow: .  No LMP recorded. (Menstrual status: IUD).. Using IUD for contraception.  She is not currently considering pregnancy.  Besides routine health maintenance, she has no other health concerns today .  GYNECOLOGIC HISTORY:  Menarche: 8  Age at first intercourse: 13 Number of lifetime partners: 5  Gilma is sexually active with 1male partner(s) and is currently in monogamous relationship.    History sexually transmitted infections:No STD history  STI testing offered?  Declined  SANDY exposure: No  History of abnormal Pap smear: NO - age 21-29 PAP every 3 years recommended  Family history of breast CA: No  Family history of uterine/ovarian CA: No    Family history of colon CA: No    HEALTH MAINTENANCE:  Cholesterol: (  Cholesterol   Date Value Ref Range Status   2021 179 <200 mg/dL Final    History of abnormal lipids: No  Mammo: no . History of abnormal Mammo: Not applicable.  Regular Self Breast Exams: No  Calcium/Vitamin D intake: source:  dairy, veggies Adequate? Yes  TSH: (  TSH   Date Value Ref Range Status   09/15/2021 0.86 0.40 - 4.00 mU/L Final   2021 0.86 0.40 - 4.00 mU/L Final    )  Pap; (  Lab Results   Component Value Date    PAP NIL 10/30/2019    )    HISTORY:  OB History    Para Term  AB Living   1 1 0 1 0 1   SAB IAB Ectopic Multiple Live Births   0 0 0 0 1      # Outcome Date GA Lbr Tayo/2nd Weight Sex Delivery Anes PTL Lv   1  14 26w0d  0.907 kg (2 lb) M CS-Classical Gen Y ROLAN       Complications: Footling breech presentation,  labor      Name: Fritz      Apgar1: 2  Apgar5: 3     Past Medical History:   Diagnosis Date    NO ACTIVE PROBLEMS      Past Surgical History:   Procedure Laterality Date     SECTION  2014     Family History   Problem Relation Age of Onset    No Known Problems Mother     No Known Problems Father     No Known Problems Sister     No Known Problems Sister     No Known Problems Brother     No Known Problems Brother     No Known Problems Brother      Social History     Socioeconomic History    Marital status:     Number of children: 1   Occupational History    Occupation: student     Employer: UNEMPLOYED   Tobacco Use    Smoking status: Never    Smokeless tobacco: Never   Vaping Use    Vaping Use: Never used   Substance and Sexual Activity    Alcohol use: Yes     Comment: Occasionally    Drug use: No    Sexual activity: Yes     Partners: Male     Birth control/protection: I.U.D.       Current Outpatient Medications:     docusate sodium (COLACE) 100 MG capsule, Take 1 capsule (100 mg) by mouth 2 times daily, Disp: 90 capsule, Rfl: 3    levonorgestrel (MIRENA) 20 MCG/24HR IUD, 1 each by Intrauterine route once, Disp: , Rfl:     Multiple Vitamins-Minerals (WOMENS MULTIVITAMIN) TABS, Take 1 tablet by mouth daily, Disp: 100 tablet, Rfl: 3    polyethylene glycol (MIRALAX) 17 GM/Dose powder, Take 17 g (1 Capful) by mouth daily, Disp: 238 g, Rfl: 3   No Known Allergies    Past medical, surgical, social and family history were reviewed and updated in EPIC.    ROS:   C:     NEGATIVE for fever, chills, change in weight  I:       NEGATIVE for worrisome rashes, moles or lesions  E:     NEGATIVE for irritation; POSITIVE changes to distance vision  E/M: NEGATIVE for ear, mouth and throat problems  R:     NEGATIVE for significant cough or SOB  CV:   NEGATIVE for chest pain, palpitations or peripheral edema  GI:     NEGATIVE for nausea, abdominal pain, heartburn;  "POSITIVE constipation  :   NEGATIVE for frequency, dysuria, hematuria, vaginal discharge, or irregular bleeding  M:     NEGATIVE for significant arthralgias or myalgia  N:      NEGATIVE for weakness, dizziness or paresthesias  E:      NEGATIVE for temperature intolerance, skin/hair changes  P:      NEGATIVE for changes in mood or affect.    EXAM:  /69   Pulse 51   Temp 97.7  F (36.5  C)   Ht 1.575 m (5' 2\")   Wt 67.6 kg (149 lb)   BMI 27.25 kg/m     BMI: Body mass index is 27.25 kg/m .  Constitutional: healthy, alert and no distress  Head: Normocephalic. No masses, lesions, tenderness or abnormalities  Neck: Neck supple. Trachea midline. No adenopathy. Thyroid symmetric, normal size.   Cardiovascular: RRR.   Respiratory: Negative.   Breast: No nodularity, asymmetry or nipple discharge bilaterally.  Gastrointestinal: Abdomen soft, non-tender, non-distended. No masses, organomegaly.  :  Vulva:  No external lesions, normal female hair distribution, no inguinal adenopathy.    Urethra:  Midline, non-tender, well supported, no discharge  Vagina:  Moist, pink, no abnormal discharge, no lesions  Musculoskeletal: extremities normal  Skin: no suspicious lesions or rashes  Psychiatric: Affect appropriate, cooperative,mentation appears normal.     COUNSELING:   Reviewed preventive health counseling, as reflected in patient instructions       Contraception   reports that she has never smoked. She has never used smokeless tobacco.    Body mass index is 27.25 kg/m .    FRAX Risk Assessment    ASSESSMENT:  23 year old female with satisfactory annual exam  (Z01.419) Well woman exam with routine gynecological exam  (primary encounter diagnosis)  Plan: Multiple Vitamins-Minerals (WOMENS         MULTIVITAMIN) TABS    (Z11.3) Screen for STD (sexually transmitted disease)  Plan: NEISSERIA GONORRHOEA PCR, CHLAMYDIA TRACHOMATIS        PCR    (Z12.4) Screening for malignant neoplasm of cervix  Plan: Pap imaged thin layer " screen reflex to HPV if         ASCUS - recommend age 25 - 29    (K59.00) Constipation, unspecified constipation type  Plan: docusate sodium (COLACE) 100 MG capsule,         polyethylene glycol (MIRALAX) 17 GM/Dose powder    Lester Medina CNM

## 2023-10-19 LAB
C TRACH DNA SPEC QL NAA+PROBE: NEGATIVE
N GONORRHOEA DNA SPEC QL NAA+PROBE: NEGATIVE

## 2023-10-20 LAB
BKR LAB AP GYN ADEQUACY: NORMAL
BKR LAB AP GYN INTERPRETATION: NORMAL
BKR LAB AP HPV REFLEX: NORMAL
BKR LAB AP PREVIOUS ABNORMAL: NORMAL
PATH REPORT.COMMENTS IMP SPEC: NORMAL
PATH REPORT.COMMENTS IMP SPEC: NORMAL
PATH REPORT.RELEVANT HX SPEC: NORMAL

## 2024-11-07 NOTE — PATIENT INSTRUCTIONS
If you have labs or imaging done, the results will automatically release in [facility] without an interpretation.  Your health care professional will review those results and send an interpretation with recommendations as soon as possible, but this may be 1-3 business days.    If you have any questions regarding your visit, please contact your care team.     [employer]: [phone]  [facility] CLINIC HOURS TELEPHONE NUMBER   DO. Maggie Lobo -Surgery Scheduler  [place] -     BALA Strickland RN Kylie, RN [place]    Monday 8:30 am-5:00 pm  Wednesday 8:30 am-5:00 pm  Friday 8:30 am-5:00 pm    Typical Surgery day: Tuesday [facility]  [address] [place] [address]  Phone:  [phone]  Fax: [phone]   Appointment [phone]    Imaging Scheduling-All Locations [phone]    [facility]  [address] [place] [address] [place] [address] [phone]   **Surgeries** Our Surgery Schedulers will contact you to schedule. If you do not receive a call within 3 business days, please call [phone].  Urgent Care locations:  [facility] Monday-Friday   10 am - 8 pm  Saturday and    9 am - 5 pm [phone]   If you need a medication refill, please contact your pharmacy. Please allow 3 business days for your refill to be completed.  As always, Thank you for trusting us with your healthcare needs!  see additional instructions from your care team below

## 2024-11-11 ENCOUNTER — OFFICE VISIT (OUTPATIENT)
Dept: OBGYN | Facility: CLINIC | Age: 24
End: 2024-11-11
Payer: COMMERCIAL

## 2024-11-11 VITALS
OXYGEN SATURATION: 99 % | BODY MASS INDEX: 25.87 KG/M2 | HEART RATE: 62 BPM | WEIGHT: 146 LBS | SYSTOLIC BLOOD PRESSURE: 112 MMHG | HEIGHT: 63 IN | DIASTOLIC BLOOD PRESSURE: 76 MMHG

## 2024-11-11 DIAGNOSIS — Z13.220 LIPID SCREENING: ICD-10-CM

## 2024-11-11 DIAGNOSIS — Z00.00 ANNUAL PHYSICAL EXAM: Primary | ICD-10-CM

## 2024-11-11 DIAGNOSIS — L70.9 ACNE, UNSPECIFIED ACNE TYPE: ICD-10-CM

## 2024-11-11 DIAGNOSIS — Z13.29 SCREENING FOR THYROID DISORDER: ICD-10-CM

## 2024-11-11 DIAGNOSIS — Z13.1 SCREENING FOR DIABETES MELLITUS: ICD-10-CM

## 2024-11-11 DIAGNOSIS — N92.0 INTERMENSTRUAL SPOTTING DUE TO INTRAUTERINE DEVICE (IUD): ICD-10-CM

## 2024-11-11 DIAGNOSIS — Z97.5 INTERMENSTRUAL SPOTTING DUE TO INTRAUTERINE DEVICE (IUD): ICD-10-CM

## 2024-11-11 PROCEDURE — 99395 PREV VISIT EST AGE 18-39: CPT | Performed by: OBSTETRICS & GYNECOLOGY

## 2024-11-11 RX ORDER — ESTRADIOL 0.5 MG/1
0.5 TABLET ORAL DAILY
Qty: 90 TABLET | Refills: 3 | Status: SHIPPED | OUTPATIENT
Start: 2024-11-11

## 2024-11-11 NOTE — PROGRESS NOTES
"Gilma is a 24 year old female, , using a Mirena IUD for birth control, who is here for her annual exam.  She just ate a yogurt prior to this appt so plans to return for fasting labwork.  She admits to daily light vaginal spotting so would like to know her treatment options since this is a nuisance.  Her Mirena IUD was inserted on 9/10/2019 so will be due for removal in 2027.  This spotting issue began about 2-3 months ago.  She also has questions regarding the treatment of facial acne so a referral to Derm was placed.      ROS: Ten point review of systems was reviewed and negative except the above.    Health Maintenance   Topic Date Due    INFLUENZA VACCINE (1) 2024    COVID-19 Vaccine ( -  season) Never done    YEARLY PREVENTIVE VISIT  10/18/2024    DTAP/TDAP/TD IMMUNIZATION (7 - Td or Tdap) 2024    ADVANCE CARE PLANNING  2026    PAP  10/18/2026    RSV VACCINE (1 - 1-dose 75+ series) 2073    HEPATITIS C SCREENING  Completed    HIV SCREENING  Completed    PHQ-2 (once per calendar year)  Completed    HPV IMMUNIZATION  Completed    MENINGITIS IMMUNIZATION  Completed    HEPATITIS B IMMUNIZATION  Completed    Pneumococcal Vaccine: Pediatrics (0 to 5 Years) and At-Risk Patients (6 to 64 Years)  Aged Out    RSV MONOCLONAL ANTIBODY  Aged Out    CHLAMYDIA SCREENING  Discontinued      Last pap: due 10/2026  Last Mammogram: not due  Last Dexa: not due  Last Colonoscopy: not due  Lab Results   Component Value Date    CHOL 179 2021     Lab Results   Component Value Date    HDL 39 2021     Lab Results   Component Value Date     2021     Lab Results   Component Value Date    TRIG 64 2021     No results found for: \"CHOLHDLRATIO\"    OBHX:      PSH:   Past Surgical History:   Procedure Laterality Date     SECTION       PMH: Her past medical, surgical, and obstetric histories were reviewed and are documented in their appropriate chart " "areas.    ALL/Meds: Her medication and allergy histories were reviewed and are documented in their appropriate chart areas.    SH/FMH: Her social and family history was reviewed and documented in its appropriate chart area.    PE: /76   Pulse 62   Ht 1.59 m (5' 2.6\")   Wt 66.2 kg (146 lb)   SpO2 99%   Breastfeeding No   BMI 26.20 kg/m    Body mass index is 26.2 kg/m .    General Appearance:  healthy, alert, active, no distress  Cardiovascular:  Regular rate and Rhythm without mumur  Neck: Supple, no adenopathy, and thyroid normal  Lungs:  Clear, without wheeze, rale or rhonchi  Breast: normal breast exam  Abdomen: Benign, Soft, flat, non-tender, No masses, organomegaly, No inguinal nodes, and Bowel sounds normoactive.   Pelvic:       - Ext: Vulva and perineum are normal without lesion, mass or discharge        - Urethra: normal without discharge        - Urethral Meatus: normal appearance       - Bladder: no tenderness, no masses       - Vagina: Normal mucosa, no discharge        - Cervix: normal and multiparous, two IUD strings present and of short length each       - Uterus:Normal shape, position and consistency        - Adnexa: Normal without masses or tenderness       - Rectal: deferred    A/P:  Well Woman Exam, Spotting With Use of IUD, Acne (Facial)     -  I discussed the new pap recommendations regarding screening.  Explained the rationale for increased intervals between paps.  Questions asked and answered.  She does agree to this regiment.  Pap was not collected today since next due in 10/2026   -  BC: IUD (Mirena) due for removal in 9/2027   -  She declines STD screening   -  We discussed treatment options and since she likes use of her Mirena IUD for contraception, will add Estrace 0.5 mg daily po and instructions on use were discussed.   -  Check future labs in a fasting state.   -  Refer to Derm for patient's concerns with facial acne.  Orders Placed This Encounter   Procedures    TSH with " free T4 reflex    Glucose    Lipid Profile (Chol, Trig, HDL, LDL calc)    Adult Dermatology  Referral      -  Encouraged self-breast exam   -  Encouraged low fat diet, regular exercise, and adequate calcium intake.    Za Reagan DO  FACOG, FACS